# Patient Record
Sex: FEMALE | Race: WHITE | Employment: OTHER | URBAN - NONMETROPOLITAN AREA
[De-identification: names, ages, dates, MRNs, and addresses within clinical notes are randomized per-mention and may not be internally consistent; named-entity substitution may affect disease eponyms.]

---

## 2017-05-26 ENCOUNTER — HOSPITAL ENCOUNTER (OUTPATIENT)
Dept: LAB | Age: 72
Discharge: HOME | End: 2017-05-26
Payer: MEDICARE

## 2017-05-26 DIAGNOSIS — E78.2 MIXED HYPERLIPIDEMIA: ICD-10-CM

## 2017-05-26 DIAGNOSIS — I10 ESSENTIAL HYPERTENSION, BENIGN: ICD-10-CM

## 2017-05-26 DIAGNOSIS — E03.9 HYPOTHYROIDISM, UNSPECIFIED TYPE: ICD-10-CM

## 2017-05-26 LAB
ALT SERPL-CCNC: 15 U/L (ref 5–33)
ANION GAP SERPL CALCULATED.3IONS-SCNC: 10 MMOL/L (ref 9–17)
AST SERPL-CCNC: 24 U/L
BUN BLDV-MCNC: 19 MG/DL (ref 8–23)
BUN/CREAT BLD: 25 (ref 9–20)
CALCIUM SERPL-MCNC: 9.3 MG/DL (ref 8.6–10.4)
CHLORIDE BLD-SCNC: 101 MMOL/L (ref 98–107)
CHOLESTEROL/HDL RATIO: 2.8
CHOLESTEROL: 199 MG/DL
CO2: 26 MMOL/L (ref 20–31)
CREAT SERPL-MCNC: 0.77 MG/DL (ref 0.5–0.9)
GFR AFRICAN AMERICAN: >60 ML/MIN
GFR NON-AFRICAN AMERICAN: >60 ML/MIN
GFR SERPL CREATININE-BSD FRML MDRD: ABNORMAL ML/MIN/{1.73_M2}
GFR SERPL CREATININE-BSD FRML MDRD: ABNORMAL ML/MIN/{1.73_M2}
GLUCOSE BLD-MCNC: 83 MG/DL (ref 70–99)
HDLC SERPL-MCNC: 70 MG/DL
LDL CHOLESTEROL: 110 MG/DL (ref 0–130)
POTASSIUM SERPL-SCNC: 4.4 MMOL/L (ref 3.7–5.3)
SODIUM BLD-SCNC: 137 MMOL/L (ref 135–144)
THYROXINE, FREE: 0.91 NG/DL (ref 0.93–1.7)
TRIGL SERPL-MCNC: 95 MG/DL
TSH SERPL DL<=0.05 MIU/L-ACNC: 4.5 MIU/L (ref 0.3–5)
VLDLC SERPL CALC-MCNC: NORMAL MG/DL (ref 1–30)

## 2017-05-26 PROCEDURE — 80061 LIPID PANEL: CPT

## 2017-05-26 PROCEDURE — 36415 COLL VENOUS BLD VENIPUNCTURE: CPT

## 2017-05-26 PROCEDURE — 84460 ALANINE AMINO (ALT) (SGPT): CPT

## 2017-05-26 PROCEDURE — 84450 TRANSFERASE (AST) (SGOT): CPT

## 2017-05-26 PROCEDURE — 84443 ASSAY THYROID STIM HORMONE: CPT

## 2017-05-26 PROCEDURE — 80048 BASIC METABOLIC PNL TOTAL CA: CPT

## 2017-05-26 PROCEDURE — 84439 ASSAY OF FREE THYROXINE: CPT

## 2017-10-04 ENCOUNTER — HOSPITAL ENCOUNTER (OUTPATIENT)
Dept: GENERAL RADIOLOGY | Age: 72
Discharge: HOME | End: 2017-10-04
Payer: MEDICARE

## 2017-10-04 ENCOUNTER — HOSPITAL ENCOUNTER (OUTPATIENT)
Age: 72
Discharge: HOME | End: 2017-10-04
Payer: MEDICARE

## 2017-10-04 DIAGNOSIS — M76.31 ILIOTIBIAL BAND SYNDROME, RIGHT: ICD-10-CM

## 2017-10-04 PROCEDURE — 73502 X-RAY EXAM HIP UNI 2-3 VIEWS: CPT

## 2017-10-09 ENCOUNTER — HOSPITAL ENCOUNTER (OUTPATIENT)
Dept: PHYSICAL THERAPY | Age: 72
Setting detail: THERAPIES SERIES
Discharge: HOME | End: 2017-10-09
Payer: MEDICARE

## 2017-10-09 PROCEDURE — 97140 MANUAL THERAPY 1/> REGIONS: CPT

## 2017-10-09 PROCEDURE — G8978 MOBILITY CURRENT STATUS: HCPCS

## 2017-10-09 PROCEDURE — G8979 MOBILITY GOAL STATUS: HCPCS

## 2017-10-09 PROCEDURE — 97110 THERAPEUTIC EXERCISES: CPT

## 2017-10-09 PROCEDURE — G0283 ELEC STIM OTHER THAN WOUND: HCPCS

## 2017-10-09 PROCEDURE — 97162 PT EVAL MOD COMPLEX 30 MIN: CPT

## 2017-10-09 NOTE — PROGRESS NOTES
Phone: [address]          Fax: [phone]                      Outpatient Physical Therapy                                                                      Evaluation  Date: 10/9/2017  Patient: Michelle Bruner  : [id number]  CSN #: [id number]  Referring Practitioner: German Graham CNP    Referral Date : 10/04/17     Diagnosis: iliotibial band syndrome right M76.31    Treatment Diagnosis: low back pain with radicuklar symptoms/ITband syndrome   Onset Date: 17  PT Insurance Information: Medicare/ [employer]   Total # of Visits Approved: 12   Total # of Visits to Date: 1  No Show: 0  Canceled Appointment: 0     Subjective  Subjective: Pt reports approx 2 weeks ago she started with R lateral leg pain. Pt reports she could barely walk last week and saw her doctor who put her on predisone. Pt reports the medication has helped her pain significantly. Pt has a history of back surgeries including fusion in  and prior surgery in . Additional Pertinent Hx: anxiety   Pain Screening  Patient Currently in Pain: Denies          Objective     Observation/Palpation  Posture: Poor  Palpation: tenderness to R piriformis and R ITband      Strength RLE  Comment: hip grossly 4-/5 knee 5/5 ankle 5/5   Strength LLE  Comment: hip grossly 4/5, knee 5/5 ankle 5/5         Additional Measures  Flexibility: decreased hamstring flexibility by 50%, decreased piriformis flexibility by 50-75%     Assessment  Body structures, Functions, Activity limitations: Decreased functional mobility , Decreased ROM, Decreased strength  Assessment: Pt is a 71year old female that presents with radicular pain down R buttocks into lateral thigh. Pt states she has been on predisone which has reduced her pain significatly. Pt has a history of low back surgeries. Pt presents with pain with ambulation, tenderness to R piriformis and ITband, and decreased flexibility.  Pt will benefit from skilled PT in

## 2017-10-09 NOTE — PLAN OF CARE
will report 25% decrease in tenderness with palpation in piriformis and ITband  Long term goals  Time Frame for Long term goals : 6 weeks  Long term goal 1: Pt will be safe and independent with her HEP  Long term goal 2: Pt will tolerate ambulation of unlimited distances without radiating pain into her R leg  Long term goal 3: Pt will improve hamstring/piriformis flexibility by 25% in order to reduce tension in low back  Long term goal 4: Pt will deny any tenderness to R piriformis and ITband during palpation and STM    Prognosis  Prognosis: Good    Treatment Plan   Times per week: 2x/wk   Plan weeks: 6 weeks   [x]HP/CP      [x]Electrical Stim   [x]Therapeutic Exercise      []Gait Training  [x]Aquatics   [x]Ultrasound         [x]Patient Education/HEP   [x]Manual Therapy  []Traction    [x]Neuro-regi        []Soft Tissue Mobs            []Home TENS  []Iontophoresis    []Orthotic casting/fitting      []Dry Needling             Electronically signed by: Chu Deal, PT, DPT    Date: 10/9/2017      ______________________________________ Date: 10/9/2017   Physician Signature

## 2017-10-11 ENCOUNTER — HOSPITAL ENCOUNTER (OUTPATIENT)
Dept: PHYSICAL THERAPY | Age: 72
Setting detail: THERAPIES SERIES
Discharge: HOME | End: 2017-10-11
Payer: MEDICARE

## 2017-10-11 PROCEDURE — G0283 ELEC STIM OTHER THAN WOUND: HCPCS

## 2017-10-11 PROCEDURE — 97110 THERAPEUTIC EXERCISES: CPT

## 2017-10-11 PROCEDURE — 97140 MANUAL THERAPY 1/> REGIONS: CPT

## 2017-10-11 ASSESSMENT — PAIN DESCRIPTION - ORIENTATION: ORIENTATION: RIGHT

## 2017-10-11 ASSESSMENT — PAIN SCALES - GENERAL: PAINLEVEL_OUTOF10: 1

## 2017-10-11 ASSESSMENT — PAIN DESCRIPTION - LOCATION: LOCATION: LEG;HIP

## 2017-10-11 ASSESSMENT — PAIN DESCRIPTION - PAIN TYPE: TYPE: ACUTE PAIN

## 2017-10-11 NOTE — PROGRESS NOTES
Physical Therapy  Phone: [facility]           Fax: [phone]                           Outpatient Physical Therapy                                                                            Daily Note    Patient: Kenny Right : [birth year]109  CSN #: [id number]   Referring Practitioner:  Lupis Deutsch CNP    Referral Date : 10/04/17     Date: 10/11/2017    Diagnosis: iliotibial band syndrome right M76.31  Treatment Diagnosis: low back pain with radicular symptoms/ITband syndrome     Onset Date: 17  PT Insurance Information: Medicare/ [employer]   Total # of Visits Approved: 12 Per Physician Order  Total # of Visits to Date: 2  No Show: 0  Canceled Appointment: 0      Pre-Treatment Pain:  1/10  Subjective: Pt states she feels the weather has made her slightly sore, as well as having been working on the exercises. Exercises:  Exercise 1: SKTC 2x20 sec  Exercise 2: LTR x10  Exercise 3: 90/90 2x10  Exercise 4: Piriformis stretch 3x30 sec  Exercise 5: hip flexor stretch 2x30 sec  Exercise 6: Scifit x5 min  Exercise 7: Prone hip extension knee flexed x10  Exercise 8: SLR x10, glute sets x10            Manual:      Soft Tissue Mobalization: STM to R piriformis and ITband    Modalities:  Moist heat: 15 min with estim to R buttocks/Lateral thigh      E-stim (parameters): IFC 15 minutes to R piriformis/Itband to reduce pain        Assessment  Body structures, Functions, Activity limitations: Decreased functional mobility , Decreased ROM, Decreased strength  Assessment: Pt did well with light strengthening progression. Difficulty with not substituting with LB muscles to complete glute strengthening in prone. Patient Education  Gradual strengthening to avoid pain irritation. Pt verbalized/demonstrated good understanding:     [x] Yes         [] No, pt required further clarification. Post Treatment Pain:  0-1/10      Plan   Continue gradual progression to pt tolerance. Goals  (Total # of Visits to Date: 2)   Short Term Goals - Time Frame for Short term goals: 3 weeks     Short term goal 1: Pt will be educated on her POC and HEP-met                                        [x]Met   []Partially met  []Not met   Short term goal 2: Pt will report 25% decrease in radicular symptoms down her leg with ambulation  []Met   []Partially met  []Not met   Short term goal 3: Pt will report 25% decrease in tenderness with palpation in piriformis and ITband  []Met   []Partially met  []Not met      []Met   []Partially met  []Not met     Long Term Goals - Time Frame for Long term goals : 6 weeks  Long term goal 1: Pt will be safe and independent with her HEP []Met  []Partially met  []Not met   Long term goal 2: Pt will tolerate ambulation of unlimited distances without radiating pain into her R leg []Met  []Partially met  []Not met   Long term goal 3: Pt will improve hamstring/piriformis flexibility by 25% in order to reduce tension in low back []Met  []Partially met  []Not met   Long term goal 4: Pt will deny any tenderness to R piriformis and ITband during palpation and STM []Met  []Partially met  []Not met     []Met  []Partially met  []Not met       Minutes Tracking:  Time In: 0930  Time Out: [address]  Minutes: GÓMEZ T[address] PTA Date: 10/11/2017

## 2017-10-17 ENCOUNTER — HOSPITAL ENCOUNTER (OUTPATIENT)
Dept: PHYSICAL THERAPY | Age: 72
Setting detail: THERAPIES SERIES
Discharge: HOME | End: 2017-10-17
Payer: MEDICARE

## 2017-10-17 PROCEDURE — 97110 THERAPEUTIC EXERCISES: CPT

## 2017-10-17 PROCEDURE — 97140 MANUAL THERAPY 1/> REGIONS: CPT

## 2017-10-17 PROCEDURE — G0283 ELEC STIM OTHER THAN WOUND: HCPCS

## 2017-10-17 ASSESSMENT — PAIN SCALES - GENERAL: PAINLEVEL_OUTOF10: 4

## 2017-10-17 ASSESSMENT — PAIN DESCRIPTION - LOCATION: LOCATION: LEG;HIP

## 2017-10-17 ASSESSMENT — PAIN DESCRIPTION - ORIENTATION: ORIENTATION: RIGHT

## 2017-10-17 NOTE — PROGRESS NOTES
Physical Therapy  Phone: [facility]           Fax: [phone]                           Outpatient Physical Therapy                                                                            Daily Note    Patient: Omega Richardson : 17[birth year]  CSN #: [id number]   Referring Practitioner:  Abelino Leigh CNP    Referral Date : 10/04/17     Date: 10/17/2017    Diagnosis: iliotibial band syndrome right M76.31  Treatment Diagnosis: low back pain with radicular symptoms/ITband syndrome     Onset Date: 17  PT Insurance Information: Medicare/ [employer]   Total # of Visits Approved: 12 Per Physician Order  Total # of Visits to Date: 3  No Show: 0  Canceled Appointment: 0      Pre-Treatment Pain:  /10  Subjective: Pt states she has been feeling better, so did a lot of housework yesterday and is more sore today. No increased soreness or pain following previous PT session and was able to walk a couple miles on a dirt path over the weekend and felt really good. Exercises:  Exercise 1: SKTC 2x20 sec  Exercise 2: LTR x10  Exercise 3: 90/90 2x10  Exercise 4: Piriformis stretch 3x30 sec  Exercise 5: hip flexor stretch 2x30 sec  Exercise 6: Scifit x6 min  Exercise 7: Prone hip extension knee flexed x10  Exercise 8: SLR x10, glute sets x10  Exercise 9: R clamshell in side-lying x10                  Manual:   Soft Tissue Mobalization: STM to R piriformis and ITband with ThermoStim probe x10 min with heat/stim application to decrease tightness and pain    Modalities:  Cryotherapy (Minutes\Location): 15 min with IFC to decrease inflammation and pain   E-stim (parameters): IFC 15 minutes to R piriformis/Itband to reduce pain           Assessment  Body structures, Functions, Activity limitations: Decreased functional mobility , Decreased ROM, Decreased strength  Assessment: Pt continues to do well with stretching and exercise. No increased pain after exercise.  Ice applied with electrical

## 2017-10-19 ENCOUNTER — HOSPITAL ENCOUNTER (OUTPATIENT)
Dept: PHYSICAL THERAPY | Age: 72
Setting detail: THERAPIES SERIES
Discharge: HOME | End: 2017-10-19
Payer: MEDICARE

## 2017-10-19 PROCEDURE — 97110 THERAPEUTIC EXERCISES: CPT

## 2017-10-19 PROCEDURE — 97140 MANUAL THERAPY 1/> REGIONS: CPT

## 2017-10-19 NOTE — PROGRESS NOTES
Physical Therapy  Phone: [facility]           Fax: [phone]                           Outpatient Physical Therapy                                                                            Daily Note    Patient: Steffany Brambila : [birth year]  CSN #: [id number]   Referring Practitioner:  Bc Bernal CNP    Referral Date : 10/04/17     Date: 10/19/2017    Diagnosis: iliotibial band syndrome right M76.31       Onset Date: 17  PT Insurance Information: Medicare/ [employer]   Total # of Visits Approved: 12 Per Physician Order  Total # of Visits to Date: 4  No Show: 0  Canceled Appointment: 0      Pre-Treatment Pain:  0/10  Subjective: Pt states she has been done with the steriod for several days now. She is feeling 'really good' and had no pain the rest of the day after last tx and no pain yesterday. Comes to therapy denying pain and feels the Thermoprobe application was very beneficial. She was able to do some yard work yesterday without increased pain.     Exercises:  Exercise 1: SKTC 2x20 sec  Exercise 3: 90/90 2x10  Exercise 4: Piriformis stretch 3x30 sec  Exercise 5: hip flexor stretch 2x30 sec  Exercise 6: Scifit x6 min  Exercise 7: Prone hip extension knee flexed [id number] due to LBP  Exercise 8: SLR 2x10, glute sets 2x10  Exercise 9: R clamshell in side-lying 2x10  Exercise 10: Hkly abduction RTB x15, adduction purple ball x15  Exercise 11: Step up FWD/LAT 6\" x10 RLE                Manual:   Soft Tissue Mobalization: STM to R piriformis and ITband with ThermoStim probe x10 min with heat/stim application to decrease tightness and pain    Modalities:      Cryotherapy (Minutes\Location): 15 min to decrease inflammation prevent soreness           Assessment  Body structures, Functions, Activity limitations: Decreased functional mobility , Decreased ROM, Decreased strength  Assessment: Pt progressing with light strengthening without issue, aside from attempted prone glute strengthening which is uncomfortable for her LB. Deferred uncomfortable exercise due to previous history of LBP. Patient Education  Strengthening progression in pain free ranges. Pt verbalized/demonstrated good understanding:     [x] Yes         [] No, pt required further clarification. Post Treatment Pain:  0/10      Plan            Goals  (Total # of Visits to Date: 4)   Short Term Goals - Time Frame for Short term goals: 3 weeks     Short term goal 1: Pt will be educated on her POC and HEP-met                                        [x]Met   []Partially met  []Not met   Short term goal 2: Pt will report 25% decrease in radicular symptoms down her leg with ambulation-Met, ongoing.   [x]Met   []Partially met  []Not met   Short term goal 3: Pt will report 25% decrease in tenderness with palpation in piriformis and ITband  []Met   []Partially met  []Not met      []Met   []Partially met  []Not met     Long Term Goals - Time Frame for Long term goals : 6 weeks  Long term goal 1: Pt will be safe and independent with her HEP []Met  []Partially met  []Not met   Long term goal 2: Pt will tolerate ambulation of unlimited distances without radiating pain into her R leg []Met  []Partially met  []Not met   Long term goal 3: Pt will improve hamstring/piriformis flexibility by 25% in order to reduce tension in low back []Met  []Partially met  []Not met   Long term goal 4: Pt will deny any tenderness to R piriformis and ITband during palpation and STM []Met  []Partially met  []Not met     []Met  []Partially met  []Not met       Minutes Tracking:  Time In: 0930  Time Out: 1030  Minutes: [address] PTA Date: 10/19/2017

## 2017-10-24 ENCOUNTER — HOSPITAL ENCOUNTER (OUTPATIENT)
Dept: PHYSICAL THERAPY | Age: 72
Setting detail: THERAPIES SERIES
Discharge: HOME | End: 2017-10-24
Payer: MEDICARE

## 2017-10-24 PROCEDURE — 97140 MANUAL THERAPY 1/> REGIONS: CPT

## 2017-10-24 PROCEDURE — 97110 THERAPEUTIC EXERCISES: CPT

## 2017-10-24 PROCEDURE — G0283 ELEC STIM OTHER THAN WOUND: HCPCS

## 2017-10-24 NOTE — PROGRESS NOTES
Phone: [facility]           Fax: [phone]                           Outpatient Physical Therapy                                                                            Daily Note    Patient: Homa Mott : [age]  CSN #: [id number]   Referring Practitioner:  Marguerite Garza CNP    Referral Date : 10/04/17     Date: 10/24/2017    Diagnosis: iliotibial band syndrome right M76.31  Treatment Diagnosis: low back pain with radicular symptoms/ITband syndrome     Onset Date: 17  PT Insurance Information: Medicare/ [employer]   Total # of Visits Approved: 12 Per Physician Order  Total # of Visits to Date: 5  No Show: 0  Canceled Appointment: 0      Pre-Treatment Pain:  0/10  Subjective: Pt reports she feels great and isn't having any pain. Pt states she would like to be done with PT in the next week. Exercises:  Exercise 1: SKTC 2x20 sec  Exercise 2: LTR x10  Exercise 3: 90/90 2x10  Exercise 4: Piriformis stretch 3x30 sec  Exercise 5: hip flexor stretch 2x30 sec  Exercise 6: Scifit x6 min     Exercise 8: SLR 2x10, glute sets 2x10  Exercise 9: R clamshell in side-lying 2x10  Exercise 10: Hkly abduction RTB x15, adduction purple ball x15  Exercise 11: Step up FWD/LAT 6\" x10 RLE          Manual:        Soft Tissue Mobalization: STM to R piriformis and ITband with ThermoStim probe x10 min with heat/stim application to decrease tightness and pain    Modalities:      Cryotherapy (Minutes\Location): 15 min to decrease inflammation prevent soreness       E-stim (parameters): IFC 15 minutes to R piriformis/Itband to reduce pain          Assessment  Assessment: Pt has met all her short term goals and is progressing with her tanner gterm ones. Pt is pushing to be done with therapy soon since she isn't having any pain. PT talked her into at least 2 more visits.      Patient Education  Pt educated on continuing for 2 more visit   Pt verbalized/demonstrated good understanding: [x] Yes         [] No, pt required further clarification. Post Treatment Pain:  0/10      Plan  Times per week: 2x/wk   Plan weeks: 6 weeks       Goals  (Total # of Visits to Date: 5)   Short Term Goals - Time Frame for Short term goals: 3 weeks   Short term goal 1: Pt will be educated on her POC and HEP-met                                        []Met   []Partially met  []Not met   Short term goal 2: Pt will report 25% decrease in radicular symptoms down her leg with ambulation-Met, ongoing.   []Met   []Partially met  []Not met   Short term goal 3: Pt will report 25% decrease in tenderness with palpation in piriformis and ITband-met   []Met   []Partially met  []Not met      []Met   []Partially met  []Not met     Long Term Goals - Time Frame for Long term goals : 6 weeks  Long term goal 1: Pt will be safe and independent with her HEP []Met  []Partially met  []Not met   Long term goal 2: Pt will tolerate ambulation of unlimited distances without radiating pain into her R leg []Met  []Partially met  []Not met   Long term goal 3: Pt will improve hamstring/piriformis flexibility by 25% in order to reduce tension in low back []Met  []Partially met  []Not met   Long term goal 4: Pt will deny any tenderness to R piriformis and ITband during palpation and STM []Met  []Partially met  []Not met     []Met  []Partially met  []Not met       Minutes Tracking:  Time In: 1110  Time Out: 1210  Minutes: [address] [place] [address] DPT, PT Date: 10/24/2017

## 2017-10-26 ENCOUNTER — HOSPITAL ENCOUNTER (OUTPATIENT)
Dept: PHYSICAL THERAPY | Age: 72
Setting detail: THERAPIES SERIES
Discharge: HOME | End: 2017-10-26
Payer: MEDICARE

## 2017-10-26 PROCEDURE — 97110 THERAPEUTIC EXERCISES: CPT

## 2017-10-26 PROCEDURE — G0283 ELEC STIM OTHER THAN WOUND: HCPCS

## 2017-10-26 NOTE — PROGRESS NOTES
Phone: [facility]           Fax: [phone]                           Outpatient Physical Therapy                                                                            Daily Note    Patient: Melinda Prakash :   CSN #: [id number]   Referring Practitioner:  Carson Stack CNP    Referral Date : 10/04/17     Date: 10/26/2017    Diagnosis: iliotibial band syndrome right M76.31  Treatment Diagnosis: low back pain with radicular symptoms/ITband syndrome     Onset Date: 17  PT Insurance Information: Medicare/ [employer]   Total # of Visits Approved: 12 Per Physician Order  Total # of Visits to Date: 6  No Show: 0  Canceled Appointment: 0      Pre-Treatment Pain:  0/10  Subjective: Pt reports she feels good. Pt is doing well on her home exercise program and with her house work she is able to control the pain. Pain is 0/10 today other than a little post ex soreness. Exercises:  Exercise 1: SKTC 2x20 sec  Exercise 2: LTR x10  Exercise 3: 90/90 HS stretch 2x30  Exercise 4: Piriformis stretch 3x30 sec     Exercise 6: Scifit x8 min  Exercise 7: Star Trac 2 x 10, 4 plates  Exercise 8: SLR 2x10, 2#/ glute sets 2x10  Exercise 9: R clamshell in side-lying 2x10  Exercise 10: Hkly abduction RTB x15, adduction purple ball x15  Exercise 11: Step up FWD/LAT 6\" x15 RLE   Exercise 12: Dillon T-band Hip abd/ext red, x10       Manual:   Soft Tissue Mobalization: not today    Modalities:  Moist heat: 15 min with estim to R buttocks/Lateral thigh            E-stim (parameters): IFC 15 minutes to R piriformis to reduce pain      Assessment  Assessment: Progressed to further strengthening and instructed patient in proper body mechanics.  Pt required mod verbal cues for slowing pace with ex    Patient Education  Discussed decreasing modality to just IFC for post ex soreness then anticipate progression and discharge next visit  Pt verbalized/demonstrated good understanding:     [x] Yes

## 2017-10-31 ENCOUNTER — HOSPITAL ENCOUNTER (OUTPATIENT)
Dept: PHYSICAL THERAPY | Age: 72
Setting detail: THERAPIES SERIES
Discharge: HOME | End: 2017-10-31
Payer: MEDICARE

## 2017-10-31 PROCEDURE — G8979 MOBILITY GOAL STATUS: HCPCS

## 2017-10-31 PROCEDURE — 97110 THERAPEUTIC EXERCISES: CPT

## 2017-10-31 PROCEDURE — G8978 MOBILITY CURRENT STATUS: HCPCS

## 2017-10-31 NOTE — PROGRESS NOTES
Phone: [facility]           Fax: [phone]                           Outpatient Physical Therapy                                                                            Daily Note    Patient: Tammy Puentes : 589  CSN #: [id number]   Referring Practitioner:  Chris Ordaz CNP    Referral Date : 10/04/17     Date: 10/31/2017    Diagnosis: iliotibial band syndrome right M76.31  Treatment Diagnosis: low back pain with radicular symptoms/ITband syndrome     Onset Date: 17  PT Insurance Information: Medicare/ [employer]   Total # of Visits Approved: 12 Per Physician Order  Total # of Visits to Date: 7  No Show: 0  Canceled Appointment: 0      Pre-Treatment Pain:  0/10  Subjective: Pt states she isn't having any pain and she would like to be D/C today     Exercises:  Exercise 1: SKTC 2x20 sec  Exercise 2: LTR x10  Exercise 3: 90/90 HS stretch 2x30  Exercise 4: Piriformis stretch 3x30 sec     Exercise 6: Scifit x10 min  Exercise 7: Star Trac 2 x 10, 4 plates  Exercise 8: SLR 2x10, 2#/ glute sets 2x10  Exercise 9: R clamshell in side-lying 2x10  Exercise 10: Hkly abduction RTB x15, adduction purple ball x15  Exercise 11: Step up FWD/LAT 6\" x15 RLE   Exercise 12: Dillon T-band Hip abd/ext red, x10       Assessment  Assessment: Pt has been to 7 therapy visits and denies any pain. Pt reports feeling 100% better and has met both her short term and long term goals. Pt has increase flexibility by 50% and denies any tenderness to ITband. Pt will be placed on a 2 week hold and was given and HEP     Patient Education  Pt educated on 2 week hold and reviewed her HEP  Pt verbalized/demonstrated good understanding:     [x] Yes         [] No, pt required further clarification.     Post Treatment Pain:  0/10      Plan  Times per week: 2x/wk   Plan weeks: 6 weeks       Goals  (Total # of Visits to Date: 7)   Short Term Goals - Time Frame for Short term goals: 3 weeks    Short term goal

## 2017-11-02 ENCOUNTER — HOSPITAL ENCOUNTER (OUTPATIENT)
Dept: PHYSICAL THERAPY | Age: 72
Setting detail: THERAPIES SERIES
Discharge: HOME | End: 2017-11-02
Payer: MEDICARE

## 2017-11-20 ENCOUNTER — HOSPITAL ENCOUNTER (OUTPATIENT)
Dept: WOMENS IMAGING | Age: 72
Discharge: HOME | End: 2017-11-20
Payer: MEDICARE

## 2017-11-20 DIAGNOSIS — Z12.39 BREAST CANCER SCREENING: ICD-10-CM

## 2017-11-20 PROCEDURE — G0202 SCR MAMMO BI INCL CAD: HCPCS

## 2017-12-07 ENCOUNTER — HOSPITAL ENCOUNTER (OUTPATIENT)
Dept: LAB | Age: 72
Discharge: HOME | End: 2017-12-07
Payer: MEDICARE

## 2017-12-07 DIAGNOSIS — I10 ESSENTIAL HYPERTENSION, BENIGN: ICD-10-CM

## 2017-12-07 DIAGNOSIS — E03.9 HYPOTHYROIDISM, UNSPECIFIED TYPE: ICD-10-CM

## 2017-12-07 DIAGNOSIS — Z11.59 NEED FOR HEPATITIS C SCREENING TEST: ICD-10-CM

## 2017-12-07 DIAGNOSIS — E78.2 MIXED HYPERLIPIDEMIA: ICD-10-CM

## 2017-12-07 LAB
ALT SERPL-CCNC: 15 U/L (ref 5–33)
ANION GAP SERPL CALCULATED.3IONS-SCNC: 13 MMOL/L (ref 9–17)
AST SERPL-CCNC: 23 U/L
BUN BLDV-MCNC: 22 MG/DL (ref 8–23)
BUN/CREAT BLD: 23 (ref 9–20)
CALCIUM SERPL-MCNC: 9.3 MG/DL (ref 8.6–10.4)
CHLORIDE BLD-SCNC: 100 MMOL/L (ref 98–107)
CHOLESTEROL/HDL RATIO: 2.9
CHOLESTEROL: 196 MG/DL
CO2: 26 MMOL/L (ref 20–31)
CREAT SERPL-MCNC: 0.94 MG/DL (ref 0.5–0.9)
GFR AFRICAN AMERICAN: >60 ML/MIN
GFR NON-AFRICAN AMERICAN: 59 ML/MIN
GFR SERPL CREATININE-BSD FRML MDRD: ABNORMAL ML/MIN/{1.73_M2}
GFR SERPL CREATININE-BSD FRML MDRD: ABNORMAL ML/MIN/{1.73_M2}
GLUCOSE BLD-MCNC: 86 MG/DL (ref 70–99)
HDLC SERPL-MCNC: 68 MG/DL
HEPATITIS C ANTIBODY: NONREACTIVE
LDL CHOLESTEROL: 111 MG/DL (ref 0–130)
POTASSIUM SERPL-SCNC: 5.1 MMOL/L (ref 3.7–5.3)
SODIUM BLD-SCNC: 139 MMOL/L (ref 135–144)
T3 FREE: 2.46 PG/ML (ref 2.02–4.43)
THYROXINE, FREE: 0.96 NG/DL (ref 0.93–1.7)
TRIGL SERPL-MCNC: 85 MG/DL
TSH SERPL DL<=0.05 MIU/L-ACNC: 4.56 MIU/L (ref 0.3–5)
VLDLC SERPL CALC-MCNC: NORMAL MG/DL (ref 1–30)

## 2017-12-07 PROCEDURE — 80061 LIPID PANEL: CPT

## 2017-12-07 PROCEDURE — 36415 COLL VENOUS BLD VENIPUNCTURE: CPT

## 2017-12-07 PROCEDURE — 84460 ALANINE AMINO (ALT) (SGPT): CPT

## 2017-12-07 PROCEDURE — 84450 TRANSFERASE (AST) (SGOT): CPT

## 2017-12-07 PROCEDURE — 84481 FREE ASSAY (FT-3): CPT

## 2017-12-07 PROCEDURE — 84439 ASSAY OF FREE THYROXINE: CPT

## 2017-12-07 PROCEDURE — 84443 ASSAY THYROID STIM HORMONE: CPT

## 2017-12-07 PROCEDURE — 80048 BASIC METABOLIC PNL TOTAL CA: CPT

## 2017-12-07 PROCEDURE — 86803 HEPATITIS C AB TEST: CPT

## 2018-01-05 NOTE — DISCHARGE SUMMARY
Phone: [facility]          Fax: [phone]                            Outpatient Physical Therapy                                                                    Discharge Summary    Patient: Thomas Talavera  :   CSN #: [id number]   Referring physician: No admitting provider for patient encounter. Referring Practitioner: Tuyet Lim CNP      Diagnosis: iliotibial band syndrome right M76.31      Date Treatment Initiated: 10/09/17  Date of Last Treatment: 10/31/17      PT Visit Information  Onset Date: 17  PT Insurance Information: Medicare/ [employer]   Total # of Visits Approved: 12  Total # of Visits to Date: 7  Plan of Care/Certification Expiration Date: 17  No Show: 0  Canceled Appointment: 0  Progress Note Counter: Gcodes 7th visit       Frequency/Duration   2x  times per week    6 weeks      Treatment Received  [x]HP/CP      [x]Electrical Stim   [x]Therapeutic Exercise      []Gait Training  [x]Aquatics   [x]Ultrasound         [x]Patient Education/HEP   [x]Manual Therapy  []Traction    [x]Neuro-regi        []Soft Tissue Mobs            []Home TENS  []Iontophoresis    []Orthotic casting/fitting      []Dry Needling    Assessment  Assessment: Pt met all her goals and was placed on 2 week hold, no issues during 2 week hold. Pt D/C as of 11/15/17           Goals  Short term goals  Time Frame for Short term goals: 3 weeks  Short term goal 1: Pt will be educated on her POC and HEP-met  Short term goal 2: Pt will report 25% decrease in radicular symptoms down her leg with ambulation-Met, ongoing.   Short term goal 3: Pt will report 25% decrease in tenderness with palpation in piriformis and ITband-met     Long term goals  Time Frame for Long term goals : 6 weeks  Long term goal 1: Pt will be safe and independent with her HEP-met  Long term goal 2: Pt will tolerate ambulation of unlimited distances without radiating pain into her R leg-met  Long term goal

## 2018-05-17 ENCOUNTER — OFFICE VISIT (OUTPATIENT)
Dept: PRIMARY CARE CLINIC | Age: 73
End: 2018-05-17
Payer: MEDICARE

## 2018-05-17 ENCOUNTER — HOSPITAL ENCOUNTER (OUTPATIENT)
Age: 73
Discharge: HOME | End: 2018-05-19
Payer: MEDICARE

## 2018-05-17 ENCOUNTER — HOSPITAL ENCOUNTER (OUTPATIENT)
Dept: GENERAL RADIOLOGY | Age: 73
Discharge: HOME | End: 2018-05-19
Payer: MEDICARE

## 2018-05-17 ENCOUNTER — TELEPHONE (OUTPATIENT)
Dept: PRIMARY CARE CLINIC | Age: 73
End: 2018-05-17

## 2018-05-17 VITALS
BODY MASS INDEX: 29.37 KG/M2 | RESPIRATION RATE: 18 BRPM | HEART RATE: 68 BPM | DIASTOLIC BLOOD PRESSURE: 92 MMHG | TEMPERATURE: 97.9 F | SYSTOLIC BLOOD PRESSURE: 142 MMHG | WEIGHT: 158 LBS

## 2018-05-17 DIAGNOSIS — M79.642 LEFT HAND PAIN: ICD-10-CM

## 2018-05-17 DIAGNOSIS — T14.8XXA CRUSHING INJURY: Primary | ICD-10-CM

## 2018-05-17 DIAGNOSIS — T14.8XXA CRUSHING INJURY: ICD-10-CM

## 2018-05-17 PROCEDURE — G8400 PT W/DXA NO RESULTS DOC: HCPCS | Performed by: NURSE PRACTITIONER

## 2018-05-17 PROCEDURE — G8427 DOCREV CUR MEDS BY ELIG CLIN: HCPCS | Performed by: NURSE PRACTITIONER

## 2018-05-17 PROCEDURE — G8417 CALC BMI ABV UP PARAM F/U: HCPCS | Performed by: NURSE PRACTITIONER

## 2018-05-17 PROCEDURE — 99213 OFFICE O/P EST LOW 20 MIN: CPT | Performed by: NURSE PRACTITIONER

## 2018-05-17 PROCEDURE — 1123F ACP DISCUSS/DSCN MKR DOCD: CPT | Performed by: NURSE PRACTITIONER

## 2018-05-17 PROCEDURE — 1036F TOBACCO NON-USER: CPT | Performed by: NURSE PRACTITIONER

## 2018-05-17 PROCEDURE — 73130 X-RAY EXAM OF HAND: CPT

## 2018-05-17 PROCEDURE — 4040F PNEUMOC VAC/ADMIN/RCVD: CPT | Performed by: NURSE PRACTITIONER

## 2018-05-17 PROCEDURE — 3017F COLORECTAL CA SCREEN DOC REV: CPT | Performed by: NURSE PRACTITIONER

## 2018-05-17 PROCEDURE — 1090F PRES/ABSN URINE INCON ASSESS: CPT | Performed by: NURSE PRACTITIONER

## 2018-05-17 ASSESSMENT — ENCOUNTER SYMPTOMS
COLOR CHANGE: 1
RESPIRATORY NEGATIVE: 1

## 2018-06-12 ENCOUNTER — HOSPITAL ENCOUNTER (OUTPATIENT)
Dept: LAB | Age: 73
Discharge: HOME | End: 2018-06-12
Payer: MEDICARE

## 2018-06-12 DIAGNOSIS — I10 ESSENTIAL HYPERTENSION: ICD-10-CM

## 2018-06-12 DIAGNOSIS — E03.9 HYPOTHYROIDISM, UNSPECIFIED TYPE: ICD-10-CM

## 2018-06-12 DIAGNOSIS — E78.2 MIXED HYPERLIPIDEMIA: ICD-10-CM

## 2018-06-12 LAB
ALT SERPL-CCNC: 16 U/L (ref 5–33)
ANION GAP SERPL CALCULATED.3IONS-SCNC: 11 MMOL/L (ref 9–17)
AST SERPL-CCNC: 24 U/L
BUN BLDV-MCNC: 16 MG/DL (ref 8–23)
BUN/CREAT BLD: 21 (ref 9–20)
CALCIUM SERPL-MCNC: 9.3 MG/DL (ref 8.6–10.4)
CHLORIDE BLD-SCNC: 103 MMOL/L (ref 98–107)
CHOLESTEROL/HDL RATIO: 2.7
CHOLESTEROL: 200 MG/DL
CO2: 28 MMOL/L (ref 20–31)
CREAT SERPL-MCNC: 0.77 MG/DL (ref 0.5–0.9)
GFR AFRICAN AMERICAN: >60 ML/MIN
GFR NON-AFRICAN AMERICAN: >60 ML/MIN
GFR SERPL CREATININE-BSD FRML MDRD: ABNORMAL ML/MIN/{1.73_M2}
GFR SERPL CREATININE-BSD FRML MDRD: ABNORMAL ML/MIN/{1.73_M2}
GLUCOSE BLD-MCNC: 87 MG/DL (ref 70–99)
HCT VFR BLD CALC: 41.2 % (ref 36.3–47.1)
HDLC SERPL-MCNC: 73 MG/DL
HEMOGLOBIN: 13.3 G/DL (ref 11.9–15.1)
LDL CHOLESTEROL: 110 MG/DL (ref 0–130)
MCH RBC QN AUTO: 30.9 PG (ref 25.2–33.5)
MCHC RBC AUTO-ENTMCNC: 32.3 G/DL (ref 28.4–34.8)
MCV RBC AUTO: 95.6 FL (ref 82.6–102.9)
NRBC AUTOMATED: 0 PER 100 WBC
PDW BLD-RTO: 12 % (ref 11.8–14.4)
PLATELET # BLD: 293 K/UL (ref 138–453)
PMV BLD AUTO: 8.6 FL (ref 8.1–13.5)
POTASSIUM SERPL-SCNC: 4.6 MMOL/L (ref 3.7–5.3)
RBC # BLD: 4.31 M/UL (ref 3.95–5.11)
SODIUM BLD-SCNC: 142 MMOL/L (ref 135–144)
T3 FREE: 2.5 PG/ML (ref 2.02–4.43)
THYROXINE, FREE: 0.95 NG/DL (ref 0.93–1.7)
TRIGL SERPL-MCNC: 87 MG/DL
TSH SERPL DL<=0.05 MIU/L-ACNC: 9.76 MIU/L (ref 0.3–5)
VLDLC SERPL CALC-MCNC: ABNORMAL MG/DL (ref 1–30)
WBC # BLD: 3.7 K/UL (ref 3.5–11.3)

## 2018-06-12 PROCEDURE — 85027 COMPLETE CBC AUTOMATED: CPT

## 2018-06-12 PROCEDURE — 80048 BASIC METABOLIC PNL TOTAL CA: CPT

## 2018-06-12 PROCEDURE — 84439 ASSAY OF FREE THYROXINE: CPT

## 2018-06-12 PROCEDURE — 84450 TRANSFERASE (AST) (SGOT): CPT

## 2018-06-12 PROCEDURE — 84460 ALANINE AMINO (ALT) (SGPT): CPT

## 2018-06-12 PROCEDURE — 84443 ASSAY THYROID STIM HORMONE: CPT

## 2018-06-12 PROCEDURE — 36415 COLL VENOUS BLD VENIPUNCTURE: CPT

## 2018-06-12 PROCEDURE — 84481 FREE ASSAY (FT-3): CPT

## 2018-06-12 PROCEDURE — 80061 LIPID PANEL: CPT

## 2018-08-22 ENCOUNTER — HOSPITAL ENCOUNTER (OUTPATIENT)
Dept: BONE DENSITY | Age: 73
Discharge: HOME | End: 2018-08-24
Payer: MEDICARE

## 2018-08-22 DIAGNOSIS — Z78.0 POST-MENOPAUSAL: ICD-10-CM

## 2018-08-22 PROCEDURE — 77080 DXA BONE DENSITY AXIAL: CPT

## 2018-11-21 ENCOUNTER — HOSPITAL ENCOUNTER (OUTPATIENT)
Dept: WOMENS IMAGING | Age: 73
Discharge: HOME | End: 2018-11-23
Payer: MEDICARE

## 2018-11-21 DIAGNOSIS — Z12.39 BREAST CANCER SCREENING: ICD-10-CM

## 2018-11-21 PROCEDURE — 77067 SCR MAMMO BI INCL CAD: CPT

## 2018-12-03 ENCOUNTER — HOSPITAL ENCOUNTER (OUTPATIENT)
Dept: LAB | Age: 73
Discharge: HOME | End: 2018-12-03
Payer: MEDICARE

## 2018-12-03 DIAGNOSIS — I10 ESSENTIAL HYPERTENSION: ICD-10-CM

## 2018-12-03 DIAGNOSIS — E78.2 MIXED HYPERLIPIDEMIA: ICD-10-CM

## 2018-12-03 DIAGNOSIS — E03.9 HYPOTHYROIDISM, UNSPECIFIED TYPE: Chronic | ICD-10-CM

## 2018-12-03 LAB
ALT SERPL-CCNC: 15 U/L (ref 5–33)
ANION GAP SERPL CALCULATED.3IONS-SCNC: 11 MMOL/L (ref 9–17)
AST SERPL-CCNC: 24 U/L
BUN BLDV-MCNC: 18 MG/DL (ref 8–23)
BUN/CREAT BLD: 23 (ref 9–20)
CALCIUM SERPL-MCNC: 9.2 MG/DL (ref 8.6–10.4)
CHLORIDE BLD-SCNC: 104 MMOL/L (ref 98–107)
CHOLESTEROL/HDL RATIO: 3.2
CHOLESTEROL: 202 MG/DL
CO2: 26 MMOL/L (ref 20–31)
CREAT SERPL-MCNC: 0.77 MG/DL (ref 0.5–0.9)
GFR AFRICAN AMERICAN: >60 ML/MIN
GFR NON-AFRICAN AMERICAN: >60 ML/MIN
GFR SERPL CREATININE-BSD FRML MDRD: ABNORMAL ML/MIN/{1.73_M2}
GFR SERPL CREATININE-BSD FRML MDRD: ABNORMAL ML/MIN/{1.73_M2}
GLUCOSE BLD-MCNC: 87 MG/DL (ref 70–99)
HCT VFR BLD CALC: 42.6 % (ref 36.3–47.1)
HDLC SERPL-MCNC: 64 MG/DL
HEMOGLOBIN: 13.5 G/DL (ref 11.9–15.1)
LDL CHOLESTEROL: 126 MG/DL (ref 0–130)
MCH RBC QN AUTO: 30.5 PG (ref 25.2–33.5)
MCHC RBC AUTO-ENTMCNC: 31.7 G/DL (ref 28.4–34.8)
MCV RBC AUTO: 96.2 FL (ref 82.6–102.9)
NRBC AUTOMATED: 0 PER 100 WBC
PDW BLD-RTO: 12.1 % (ref 11.8–14.4)
PLATELET # BLD: 287 K/UL (ref 138–453)
PMV BLD AUTO: 8.5 FL (ref 8.1–13.5)
POTASSIUM SERPL-SCNC: 4.5 MMOL/L (ref 3.7–5.3)
RBC # BLD: 4.43 M/UL (ref 3.95–5.11)
SODIUM BLD-SCNC: 141 MMOL/L (ref 135–144)
T3 FREE: 2.63 PG/ML (ref 2.02–4.43)
THYROXINE, FREE: 1.01 NG/DL (ref 0.93–1.7)
TRIGL SERPL-MCNC: 62 MG/DL
TSH SERPL DL<=0.05 MIU/L-ACNC: 3.39 MIU/L (ref 0.3–5)
VLDLC SERPL CALC-MCNC: ABNORMAL MG/DL (ref 1–30)
WBC # BLD: 4.2 K/UL (ref 3.5–11.3)

## 2018-12-03 PROCEDURE — 84443 ASSAY THYROID STIM HORMONE: CPT

## 2018-12-03 PROCEDURE — 85027 COMPLETE CBC AUTOMATED: CPT

## 2018-12-03 PROCEDURE — 80048 BASIC METABOLIC PNL TOTAL CA: CPT

## 2018-12-03 PROCEDURE — 84460 ALANINE AMINO (ALT) (SGPT): CPT

## 2018-12-03 PROCEDURE — 84450 TRANSFERASE (AST) (SGOT): CPT

## 2018-12-03 PROCEDURE — 84439 ASSAY OF FREE THYROXINE: CPT

## 2018-12-03 PROCEDURE — 80061 LIPID PANEL: CPT

## 2018-12-03 PROCEDURE — 36415 COLL VENOUS BLD VENIPUNCTURE: CPT

## 2018-12-03 PROCEDURE — 84481 FREE ASSAY (FT-3): CPT

## 2019-06-11 ENCOUNTER — HOSPITAL ENCOUNTER (OUTPATIENT)
Dept: LAB | Age: 74
Discharge: HOME | End: 2019-06-11
Payer: MEDICARE

## 2019-06-11 DIAGNOSIS — E78.2 MIXED HYPERLIPIDEMIA: ICD-10-CM

## 2019-06-11 DIAGNOSIS — I10 ESSENTIAL HYPERTENSION: ICD-10-CM

## 2019-06-11 DIAGNOSIS — E03.9 HYPOTHYROIDISM, UNSPECIFIED TYPE: ICD-10-CM

## 2019-06-11 LAB
ALT SERPL-CCNC: 17 U/L (ref 5–33)
ANION GAP SERPL CALCULATED.3IONS-SCNC: 9 MMOL/L (ref 9–17)
AST SERPL-CCNC: 28 U/L
BUN BLDV-MCNC: 18 MG/DL (ref 8–23)
BUN/CREAT BLD: 25 (ref 9–20)
CALCIUM SERPL-MCNC: 9.1 MG/DL (ref 8.6–10.4)
CHLORIDE BLD-SCNC: 95 MMOL/L (ref 98–107)
CHOLESTEROL/HDL RATIO: 2.6
CHOLESTEROL: 190 MG/DL
CO2: 29 MMOL/L (ref 20–31)
CREAT SERPL-MCNC: 0.72 MG/DL (ref 0.5–0.9)
GFR AFRICAN AMERICAN: >60 ML/MIN
GFR NON-AFRICAN AMERICAN: >60 ML/MIN
GFR SERPL CREATININE-BSD FRML MDRD: ABNORMAL ML/MIN/{1.73_M2}
GFR SERPL CREATININE-BSD FRML MDRD: ABNORMAL ML/MIN/{1.73_M2}
GLUCOSE BLD-MCNC: 100 MG/DL (ref 70–99)
HCT VFR BLD CALC: 38.6 % (ref 36.3–47.1)
HDLC SERPL-MCNC: 74 MG/DL
HEMOGLOBIN: 12.6 G/DL (ref 11.9–15.1)
LDL CHOLESTEROL: 100 MG/DL (ref 0–130)
MCH RBC QN AUTO: 31.3 PG (ref 25.2–33.5)
MCHC RBC AUTO-ENTMCNC: 32.6 G/DL (ref 28.4–34.8)
MCV RBC AUTO: 96 FL (ref 82.6–102.9)
NRBC AUTOMATED: 0 PER 100 WBC
PDW BLD-RTO: 12.1 % (ref 11.8–14.4)
PLATELET # BLD: 312 K/UL (ref 138–453)
PMV BLD AUTO: 8.3 FL (ref 8.1–13.5)
POTASSIUM SERPL-SCNC: 4.2 MMOL/L (ref 3.7–5.3)
RBC # BLD: 4.02 M/UL (ref 3.95–5.11)
SODIUM BLD-SCNC: 133 MMOL/L (ref 135–144)
T3 FREE: 2.44 PG/ML (ref 2.02–4.43)
THYROXINE, FREE: 0.91 NG/DL (ref 0.93–1.7)
TRIGL SERPL-MCNC: 79 MG/DL
TSH SERPL DL<=0.05 MIU/L-ACNC: 3.65 MIU/L (ref 0.3–5)
VLDLC SERPL CALC-MCNC: NORMAL MG/DL (ref 1–30)
WBC # BLD: 4 K/UL (ref 3.5–11.3)

## 2019-06-11 PROCEDURE — 85027 COMPLETE CBC AUTOMATED: CPT

## 2019-06-11 PROCEDURE — 84450 TRANSFERASE (AST) (SGOT): CPT

## 2019-06-11 PROCEDURE — 36415 COLL VENOUS BLD VENIPUNCTURE: CPT

## 2019-06-11 PROCEDURE — 84443 ASSAY THYROID STIM HORMONE: CPT

## 2019-06-11 PROCEDURE — 84460 ALANINE AMINO (ALT) (SGPT): CPT

## 2019-06-11 PROCEDURE — 80048 BASIC METABOLIC PNL TOTAL CA: CPT

## 2019-06-11 PROCEDURE — 80061 LIPID PANEL: CPT

## 2019-06-11 PROCEDURE — 84439 ASSAY OF FREE THYROXINE: CPT

## 2019-06-11 PROCEDURE — 84481 FREE ASSAY (FT-3): CPT

## 2019-07-29 ENCOUNTER — HOSPITAL ENCOUNTER (OUTPATIENT)
Dept: LAB | Age: 74
Discharge: HOME | End: 2019-07-29
Payer: MEDICARE

## 2019-07-29 DIAGNOSIS — E03.9 HYPOTHYROIDISM, UNSPECIFIED TYPE: ICD-10-CM

## 2019-07-29 LAB
T3 FREE: 2.76 PG/ML (ref 2.02–4.43)
THYROXINE, FREE: 1.11 NG/DL (ref 0.93–1.7)
TSH SERPL DL<=0.05 MIU/L-ACNC: 1.16 MIU/L (ref 0.3–5)

## 2019-07-29 PROCEDURE — 84443 ASSAY THYROID STIM HORMONE: CPT

## 2019-07-29 PROCEDURE — 84439 ASSAY OF FREE THYROXINE: CPT

## 2019-07-29 PROCEDURE — 36415 COLL VENOUS BLD VENIPUNCTURE: CPT

## 2019-07-29 PROCEDURE — 84481 FREE ASSAY (FT-3): CPT

## 2019-12-09 ENCOUNTER — HOSPITAL ENCOUNTER (OUTPATIENT)
Dept: WOMENS IMAGING | Age: 74
Discharge: HOME | End: 2019-12-11
Payer: MEDICARE

## 2019-12-09 ENCOUNTER — HOSPITAL ENCOUNTER (OUTPATIENT)
Age: 74
Discharge: HOME | End: 2019-12-11
Payer: MEDICARE

## 2019-12-09 ENCOUNTER — HOSPITAL ENCOUNTER (OUTPATIENT)
Age: 74
Discharge: HOME | End: 2019-12-09
Payer: MEDICARE

## 2019-12-09 DIAGNOSIS — I10 HYPERTENSION, UNSPECIFIED TYPE: ICD-10-CM

## 2019-12-09 DIAGNOSIS — Z12.31 BREAST CANCER SCREENING BY MAMMOGRAM: ICD-10-CM

## 2019-12-09 DIAGNOSIS — E03.9 HYPOTHYROIDISM, UNSPECIFIED TYPE: ICD-10-CM

## 2019-12-09 DIAGNOSIS — E78.2 MIXED HYPERLIPIDEMIA: ICD-10-CM

## 2019-12-09 LAB
ALT SERPL-CCNC: 17 U/L (ref 5–33)
ANION GAP SERPL CALCULATED.3IONS-SCNC: 10 MMOL/L (ref 9–17)
AST SERPL-CCNC: 26 U/L
BUN BLDV-MCNC: 16 MG/DL (ref 8–23)
BUN/CREAT BLD: 23 (ref 9–20)
CALCIUM SERPL-MCNC: 9.9 MG/DL (ref 8.6–10.4)
CHLORIDE BLD-SCNC: 96 MMOL/L (ref 98–107)
CHOLESTEROL/HDL RATIO: 2.5
CHOLESTEROL: 198 MG/DL
CO2: 28 MMOL/L (ref 20–31)
CREAT SERPL-MCNC: 0.71 MG/DL (ref 0.5–0.9)
GFR AFRICAN AMERICAN: >60 ML/MIN
GFR NON-AFRICAN AMERICAN: >60 ML/MIN
GFR SERPL CREATININE-BSD FRML MDRD: ABNORMAL ML/MIN/{1.73_M2}
GFR SERPL CREATININE-BSD FRML MDRD: ABNORMAL ML/MIN/{1.73_M2}
GLUCOSE BLD-MCNC: 93 MG/DL (ref 70–99)
HCT VFR BLD CALC: 43 % (ref 36.3–47.1)
HDLC SERPL-MCNC: 79 MG/DL
HEMOGLOBIN: 13.7 G/DL (ref 11.9–15.1)
LDL CHOLESTEROL: 105 MG/DL (ref 0–130)
MCH RBC QN AUTO: 30.8 PG (ref 25.2–33.5)
MCHC RBC AUTO-ENTMCNC: 31.9 G/DL (ref 28.4–34.8)
MCV RBC AUTO: 96.6 FL (ref 82.6–102.9)
NRBC AUTOMATED: 0 PER 100 WBC
PDW BLD-RTO: 12.1 % (ref 11.8–14.4)
PLATELET # BLD: 330 K/UL (ref 138–453)
PMV BLD AUTO: 8.5 FL (ref 8.1–13.5)
POTASSIUM SERPL-SCNC: 4.7 MMOL/L (ref 3.7–5.3)
RBC # BLD: 4.45 M/UL (ref 3.95–5.11)
SODIUM BLD-SCNC: 134 MMOL/L (ref 135–144)
T3 FREE: 2.55 PG/ML (ref 2.02–4.43)
THYROXINE, FREE: 1.19 NG/DL (ref 0.93–1.7)
TRIGL SERPL-MCNC: 70 MG/DL
TSH SERPL DL<=0.05 MIU/L-ACNC: 3.56 MIU/L (ref 0.3–5)
VLDLC SERPL CALC-MCNC: NORMAL MG/DL (ref 1–30)
WBC # BLD: 5.1 K/UL (ref 3.5–11.3)

## 2019-12-09 PROCEDURE — 77063 BREAST TOMOSYNTHESIS BI: CPT

## 2019-12-09 PROCEDURE — 84450 TRANSFERASE (AST) (SGOT): CPT

## 2019-12-09 PROCEDURE — 80048 BASIC METABOLIC PNL TOTAL CA: CPT

## 2019-12-09 PROCEDURE — 84439 ASSAY OF FREE THYROXINE: CPT

## 2019-12-09 PROCEDURE — 84481 FREE ASSAY (FT-3): CPT

## 2019-12-09 PROCEDURE — 84460 ALANINE AMINO (ALT) (SGPT): CPT

## 2019-12-09 PROCEDURE — 85027 COMPLETE CBC AUTOMATED: CPT

## 2019-12-09 PROCEDURE — 84443 ASSAY THYROID STIM HORMONE: CPT

## 2019-12-09 PROCEDURE — 36415 COLL VENOUS BLD VENIPUNCTURE: CPT

## 2019-12-09 PROCEDURE — 80061 LIPID PANEL: CPT

## 2020-02-04 ENCOUNTER — HOSPITAL ENCOUNTER (OUTPATIENT)
Age: 75
Discharge: HOME | End: 2020-02-06
Payer: MEDICARE

## 2020-02-04 ENCOUNTER — HOSPITAL ENCOUNTER (OUTPATIENT)
Dept: GENERAL RADIOLOGY | Age: 75
Discharge: HOME | End: 2020-02-06
Payer: MEDICARE

## 2020-02-04 PROCEDURE — 73140 X-RAY EXAM OF FINGER(S): CPT

## 2020-06-18 ENCOUNTER — HOSPITAL ENCOUNTER (OUTPATIENT)
Dept: LAB | Age: 75
Discharge: HOME | End: 2020-06-18
Payer: MEDICARE

## 2020-06-18 LAB
ALT SERPL-CCNC: 21 U/L (ref 5–33)
ANION GAP SERPL CALCULATED.3IONS-SCNC: 11 MMOL/L (ref 9–17)
AST SERPL-CCNC: 30 U/L
BUN BLDV-MCNC: 20 MG/DL (ref 8–23)
BUN/CREAT BLD: 26 (ref 9–20)
CALCIUM SERPL-MCNC: 9.5 MG/DL (ref 8.6–10.4)
CHLORIDE BLD-SCNC: 92 MMOL/L (ref 98–107)
CHOLESTEROL/HDL RATIO: 2.2
CHOLESTEROL: 177 MG/DL
CO2: 26 MMOL/L (ref 20–31)
CREAT SERPL-MCNC: 0.77 MG/DL (ref 0.5–0.9)
GFR AFRICAN AMERICAN: >60 ML/MIN
GFR NON-AFRICAN AMERICAN: >60 ML/MIN
GFR SERPL CREATININE-BSD FRML MDRD: ABNORMAL ML/MIN/{1.73_M2}
GFR SERPL CREATININE-BSD FRML MDRD: ABNORMAL ML/MIN/{1.73_M2}
GLUCOSE BLD-MCNC: 99 MG/DL (ref 70–99)
HCT VFR BLD CALC: 40.6 % (ref 36.3–47.1)
HDLC SERPL-MCNC: 80 MG/DL
HEMOGLOBIN: 13.2 G/DL (ref 11.9–15.1)
LDL CHOLESTEROL: 85 MG/DL (ref 0–130)
MCH RBC QN AUTO: 31.2 PG (ref 25.2–33.5)
MCHC RBC AUTO-ENTMCNC: 32.5 G/DL (ref 28.4–34.8)
MCV RBC AUTO: 96 FL (ref 82.6–102.9)
NRBC AUTOMATED: 0 PER 100 WBC
PDW BLD-RTO: 11.8 % (ref 11.8–14.4)
PLATELET # BLD: 318 K/UL (ref 138–453)
PMV BLD AUTO: 7.8 FL (ref 8.1–13.5)
POTASSIUM SERPL-SCNC: 5.4 MMOL/L (ref 3.7–5.3)
RBC # BLD: 4.23 M/UL (ref 3.95–5.11)
SODIUM BLD-SCNC: 129 MMOL/L (ref 135–144)
T3 FREE: 2.69 PG/ML (ref 2.02–4.43)
THYROXINE, FREE: 1.3 NG/DL (ref 0.93–1.7)
TRIGL SERPL-MCNC: 58 MG/DL
TSH SERPL DL<=0.05 MIU/L-ACNC: 1.01 MIU/L (ref 0.3–5)
VLDLC SERPL CALC-MCNC: NORMAL MG/DL (ref 1–30)
WBC # BLD: 3.8 K/UL (ref 3.5–11.3)

## 2020-06-18 PROCEDURE — 84439 ASSAY OF FREE THYROXINE: CPT

## 2020-06-18 PROCEDURE — 80061 LIPID PANEL: CPT

## 2020-06-18 PROCEDURE — 84481 FREE ASSAY (FT-3): CPT

## 2020-06-18 PROCEDURE — 84450 TRANSFERASE (AST) (SGOT): CPT

## 2020-06-18 PROCEDURE — 80048 BASIC METABOLIC PNL TOTAL CA: CPT

## 2020-06-18 PROCEDURE — 84460 ALANINE AMINO (ALT) (SGPT): CPT

## 2020-06-18 PROCEDURE — 84443 ASSAY THYROID STIM HORMONE: CPT

## 2020-06-18 PROCEDURE — 85027 COMPLETE CBC AUTOMATED: CPT

## 2020-06-18 PROCEDURE — 36415 COLL VENOUS BLD VENIPUNCTURE: CPT

## 2020-12-15 ENCOUNTER — HOSPITAL ENCOUNTER (OUTPATIENT)
Dept: LAB | Age: 75
Discharge: HOME | End: 2020-12-15
Payer: MEDICARE

## 2020-12-15 LAB
-: ABNORMAL
ALT SERPL-CCNC: 17 U/L (ref 5–33)
AMORPHOUS: ABNORMAL
ANION GAP SERPL CALCULATED.3IONS-SCNC: 10 MMOL/L (ref 9–17)
AST SERPL-CCNC: 26 U/L
BACTERIA: ABNORMAL
BILIRUBIN URINE: NEGATIVE
BUN BLDV-MCNC: 21 MG/DL (ref 8–23)
BUN/CREAT BLD: 29 (ref 9–20)
CALCIUM SERPL-MCNC: 9.6 MG/DL (ref 8.6–10.4)
CASTS UA: ABNORMAL /LPF
CHLORIDE BLD-SCNC: 100 MMOL/L (ref 98–107)
CHOLESTEROL/HDL RATIO: 2.7
CHOLESTEROL: 205 MG/DL
CO2: 26 MMOL/L (ref 20–31)
COLOR: YELLOW
COMMENT UA: ABNORMAL
CREAT SERPL-MCNC: 0.73 MG/DL (ref 0.5–0.9)
CRYSTALS, UA: ABNORMAL /HPF
EPITHELIAL CELLS UA: ABNORMAL /HPF (ref 0–25)
GFR AFRICAN AMERICAN: >60 ML/MIN
GFR NON-AFRICAN AMERICAN: >60 ML/MIN
GFR SERPL CREATININE-BSD FRML MDRD: ABNORMAL ML/MIN/{1.73_M2}
GFR SERPL CREATININE-BSD FRML MDRD: ABNORMAL ML/MIN/{1.73_M2}
GLUCOSE BLD-MCNC: 87 MG/DL (ref 70–99)
GLUCOSE URINE: NEGATIVE
HCT VFR BLD CALC: 42.8 % (ref 36.3–47.1)
HDLC SERPL-MCNC: 76 MG/DL
HEMOGLOBIN: 13.7 G/DL (ref 11.9–15.1)
KETONES, URINE: NEGATIVE
LDL CHOLESTEROL: 113 MG/DL (ref 0–130)
LEUKOCYTE ESTERASE, URINE: NEGATIVE
MCH RBC QN AUTO: 30.6 PG (ref 25.2–33.5)
MCHC RBC AUTO-ENTMCNC: 32 G/DL (ref 28.4–34.8)
MCV RBC AUTO: 95.7 FL (ref 82.6–102.9)
MUCUS: ABNORMAL
NITRITE, URINE: NEGATIVE
NRBC AUTOMATED: 0 PER 100 WBC
OTHER OBSERVATIONS UA: ABNORMAL
PDW BLD-RTO: 12.4 % (ref 11.8–14.4)
PH UA: 6 (ref 5–9)
PLATELET # BLD: 314 K/UL (ref 138–453)
PMV BLD AUTO: 8.6 FL (ref 8.1–13.5)
POTASSIUM SERPL-SCNC: 4.6 MMOL/L (ref 3.7–5.3)
PROTEIN UA: NEGATIVE
RBC # BLD: 4.47 M/UL (ref 3.95–5.11)
RBC UA: ABNORMAL /HPF (ref 0–2)
RENAL EPITHELIAL, UA: ABNORMAL /HPF
SODIUM BLD-SCNC: 136 MMOL/L (ref 135–144)
SPECIFIC GRAVITY UA: 1.02 (ref 1.01–1.02)
T3 FREE: 2.55 PG/ML (ref 2.02–4.43)
THYROXINE, FREE: 1.05 NG/DL (ref 0.93–1.7)
TRICHOMONAS: ABNORMAL
TRIGL SERPL-MCNC: 80 MG/DL
TSH SERPL DL<=0.05 MIU/L-ACNC: 1.5 MIU/L (ref 0.3–5)
TURBIDITY: CLEAR
URINE HGB: NEGATIVE
UROBILINOGEN, URINE: NORMAL
VLDLC SERPL CALC-MCNC: ABNORMAL MG/DL (ref 1–30)
WBC # BLD: 4.8 K/UL (ref 3.5–11.3)
WBC UA: ABNORMAL /HPF (ref 0–5)
YEAST: ABNORMAL

## 2020-12-15 PROCEDURE — 84481 FREE ASSAY (FT-3): CPT

## 2020-12-15 PROCEDURE — 81001 URINALYSIS AUTO W/SCOPE: CPT

## 2020-12-15 PROCEDURE — 84460 ALANINE AMINO (ALT) (SGPT): CPT

## 2020-12-15 PROCEDURE — 84450 TRANSFERASE (AST) (SGOT): CPT

## 2020-12-15 PROCEDURE — 84443 ASSAY THYROID STIM HORMONE: CPT

## 2020-12-15 PROCEDURE — 85027 COMPLETE CBC AUTOMATED: CPT

## 2020-12-15 PROCEDURE — 80048 BASIC METABOLIC PNL TOTAL CA: CPT

## 2020-12-15 PROCEDURE — 84439 ASSAY OF FREE THYROXINE: CPT

## 2020-12-15 PROCEDURE — 36415 COLL VENOUS BLD VENIPUNCTURE: CPT

## 2020-12-15 PROCEDURE — 80061 LIPID PANEL: CPT

## 2021-06-21 ENCOUNTER — HOSPITAL ENCOUNTER (OUTPATIENT)
Age: 76
Discharge: HOME | End: 2021-06-21
Payer: MEDICARE

## 2021-06-21 DIAGNOSIS — I10 ESSENTIAL HYPERTENSION, BENIGN: ICD-10-CM

## 2021-06-21 DIAGNOSIS — E03.9 HYPOTHYROIDISM, UNSPECIFIED TYPE: ICD-10-CM

## 2021-06-21 DIAGNOSIS — E78.2 MIXED HYPERLIPIDEMIA: ICD-10-CM

## 2021-06-21 LAB
ALT SERPL-CCNC: 18 U/L (ref 5–33)
ANION GAP SERPL CALCULATED.3IONS-SCNC: 9 MMOL/L (ref 9–17)
AST SERPL-CCNC: 26 U/L
BUN BLDV-MCNC: 15 MG/DL (ref 8–23)
BUN/CREAT BLD: 19 (ref 9–20)
CALCIUM SERPL-MCNC: 9.7 MG/DL (ref 8.6–10.4)
CHLORIDE BLD-SCNC: 101 MMOL/L (ref 98–107)
CHOLESTEROL/HDL RATIO: 2.6
CHOLESTEROL: 189 MG/DL
CO2: 28 MMOL/L (ref 20–31)
CREAT SERPL-MCNC: 0.78 MG/DL (ref 0.5–0.9)
GFR AFRICAN AMERICAN: >60 ML/MIN
GFR NON-AFRICAN AMERICAN: >60 ML/MIN
GFR SERPL CREATININE-BSD FRML MDRD: NORMAL ML/MIN/{1.73_M2}
GFR SERPL CREATININE-BSD FRML MDRD: NORMAL ML/MIN/{1.73_M2}
GLUCOSE BLD-MCNC: 96 MG/DL (ref 70–99)
HCT VFR BLD CALC: 42.3 % (ref 36.3–47.1)
HDLC SERPL-MCNC: 74 MG/DL
HEMOGLOBIN: 13.7 G/DL (ref 11.9–15.1)
LDL CHOLESTEROL: 98 MG/DL (ref 0–130)
MCH RBC QN AUTO: 30.9 PG (ref 25.2–33.5)
MCHC RBC AUTO-ENTMCNC: 32.4 G/DL (ref 28.4–34.8)
MCV RBC AUTO: 95.3 FL (ref 82.6–102.9)
NRBC AUTOMATED: 0 PER 100 WBC
PDW BLD-RTO: 12.5 % (ref 11.8–14.4)
PLATELET # BLD: 302 K/UL (ref 138–453)
PMV BLD AUTO: 8.6 FL (ref 8.1–13.5)
POTASSIUM SERPL-SCNC: 4.9 MMOL/L (ref 3.7–5.3)
RBC # BLD: 4.44 M/UL (ref 3.95–5.11)
SODIUM BLD-SCNC: 138 MMOL/L (ref 135–144)
T3 FREE: 2.64 PG/ML (ref 2.02–4.43)
THYROXINE, FREE: 1.23 NG/DL (ref 0.93–1.7)
TRIGL SERPL-MCNC: 85 MG/DL
TSH SERPL DL<=0.05 MIU/L-ACNC: 1.5 MIU/L (ref 0.3–5)
VLDLC SERPL CALC-MCNC: NORMAL MG/DL (ref 1–30)
WBC # BLD: 5.2 K/UL (ref 3.5–11.3)

## 2021-06-21 PROCEDURE — 80048 BASIC METABOLIC PNL TOTAL CA: CPT

## 2021-06-21 PROCEDURE — 84450 TRANSFERASE (AST) (SGOT): CPT

## 2021-06-21 PROCEDURE — 84439 ASSAY OF FREE THYROXINE: CPT

## 2021-06-21 PROCEDURE — 84460 ALANINE AMINO (ALT) (SGPT): CPT

## 2021-06-21 PROCEDURE — 84481 FREE ASSAY (FT-3): CPT

## 2021-06-21 PROCEDURE — 80061 LIPID PANEL: CPT

## 2021-06-21 PROCEDURE — 36415 COLL VENOUS BLD VENIPUNCTURE: CPT

## 2021-06-21 PROCEDURE — 84443 ASSAY THYROID STIM HORMONE: CPT

## 2021-06-21 PROCEDURE — 85027 COMPLETE CBC AUTOMATED: CPT

## 2021-07-01 PROBLEM — J44.9 CHRONIC OBSTRUCTIVE PULMONARY DISEASE, UNSPECIFIED COPD TYPE (HCC): Status: ACTIVE | Noted: 2021-07-01

## 2021-07-02 ENCOUNTER — HOSPITAL ENCOUNTER (OUTPATIENT)
Dept: WOMENS IMAGING | Age: 76
Discharge: HOME | End: 2021-07-04
Payer: MEDICARE

## 2021-07-02 DIAGNOSIS — Z12.31 ENCOUNTER FOR SCREENING MAMMOGRAM FOR MALIGNANT NEOPLASM OF BREAST: ICD-10-CM

## 2021-07-02 PROCEDURE — 77063 BREAST TOMOSYNTHESIS BI: CPT

## 2021-12-20 ENCOUNTER — HOSPITAL ENCOUNTER (OUTPATIENT)
Age: 76
Discharge: HOME | End: 2021-12-20
Payer: MEDICARE

## 2021-12-20 DIAGNOSIS — E03.9 HYPOTHYROIDISM, UNSPECIFIED TYPE: ICD-10-CM

## 2021-12-20 DIAGNOSIS — E78.2 MIXED HYPERLIPIDEMIA: ICD-10-CM

## 2021-12-20 DIAGNOSIS — I10 ESSENTIAL HYPERTENSION, BENIGN: ICD-10-CM

## 2021-12-20 LAB
ALT SERPL-CCNC: 17 U/L (ref 5–33)
ANION GAP SERPL CALCULATED.3IONS-SCNC: 12 MMOL/L (ref 9–17)
AST SERPL-CCNC: 23 U/L
BUN BLDV-MCNC: 19 MG/DL (ref 8–23)
BUN/CREAT BLD: 27 (ref 9–20)
CALCIUM SERPL-MCNC: 9.2 MG/DL (ref 8.6–10.4)
CHLORIDE BLD-SCNC: 101 MMOL/L (ref 98–107)
CHOLESTEROL/HDL RATIO: 2.7
CHOLESTEROL: 206 MG/DL
CO2: 25 MMOL/L (ref 20–31)
CREAT SERPL-MCNC: 0.71 MG/DL (ref 0.5–0.9)
GFR AFRICAN AMERICAN: >60 ML/MIN
GFR NON-AFRICAN AMERICAN: >60 ML/MIN
GFR SERPL CREATININE-BSD FRML MDRD: ABNORMAL ML/MIN/{1.73_M2}
GFR SERPL CREATININE-BSD FRML MDRD: ABNORMAL ML/MIN/{1.73_M2}
GLUCOSE BLD-MCNC: 93 MG/DL (ref 70–99)
HCT VFR BLD CALC: 43.2 % (ref 36.3–47.1)
HDLC SERPL-MCNC: 75 MG/DL
HEMOGLOBIN: 14 G/DL (ref 11.9–15.1)
LDL CHOLESTEROL: 115 MG/DL (ref 0–130)
MCH RBC QN AUTO: 30.7 PG (ref 25.2–33.5)
MCHC RBC AUTO-ENTMCNC: 32.4 G/DL (ref 28.4–34.8)
MCV RBC AUTO: 94.7 FL (ref 82.6–102.9)
NRBC AUTOMATED: 0 PER 100 WBC
PDW BLD-RTO: 12.3 % (ref 11.8–14.4)
PLATELET # BLD: 287 K/UL (ref 138–453)
PMV BLD AUTO: 8.7 FL (ref 8.1–13.5)
POTASSIUM SERPL-SCNC: 3.8 MMOL/L (ref 3.7–5.3)
RBC # BLD: 4.56 M/UL (ref 3.95–5.11)
SODIUM BLD-SCNC: 138 MMOL/L (ref 135–144)
T3 FREE: 2.48 PG/ML (ref 2.02–4.43)
THYROXINE, FREE: 1.11 NG/DL (ref 0.93–1.7)
TRIGL SERPL-MCNC: 78 MG/DL
TSH SERPL DL<=0.05 MIU/L-ACNC: 2.12 MIU/L (ref 0.3–5)
VLDLC SERPL CALC-MCNC: ABNORMAL MG/DL (ref 1–30)
WBC # BLD: 4.6 K/UL (ref 3.5–11.3)

## 2021-12-20 PROCEDURE — 85027 COMPLETE CBC AUTOMATED: CPT

## 2021-12-20 PROCEDURE — 84443 ASSAY THYROID STIM HORMONE: CPT

## 2021-12-20 PROCEDURE — 36415 COLL VENOUS BLD VENIPUNCTURE: CPT

## 2021-12-20 PROCEDURE — 84460 ALANINE AMINO (ALT) (SGPT): CPT

## 2021-12-20 PROCEDURE — 84450 TRANSFERASE (AST) (SGOT): CPT

## 2021-12-20 PROCEDURE — 84439 ASSAY OF FREE THYROXINE: CPT

## 2021-12-20 PROCEDURE — 84481 FREE ASSAY (FT-3): CPT

## 2021-12-20 PROCEDURE — 80061 LIPID PANEL: CPT

## 2021-12-20 PROCEDURE — 80048 BASIC METABOLIC PNL TOTAL CA: CPT

## 2022-01-19 ENCOUNTER — HOSPITAL ENCOUNTER (EMERGENCY)
Age: 77
Discharge: HOME | End: 2022-01-19
Payer: MEDICARE

## 2022-01-19 VITALS
RESPIRATION RATE: 19 BRPM | WEIGHT: 155 LBS | HEIGHT: 62 IN | HEART RATE: 64 BPM | DIASTOLIC BLOOD PRESSURE: 90 MMHG | SYSTOLIC BLOOD PRESSURE: 158 MMHG | TEMPERATURE: 97.4 F | BODY MASS INDEX: 28.52 KG/M2 | OXYGEN SATURATION: 94 %

## 2022-01-19 DIAGNOSIS — R03.0 ELEVATED BLOOD PRESSURE READING: Primary | ICD-10-CM

## 2022-01-19 LAB
ABSOLUTE EOS #: 0.14 K/UL (ref 0–0.44)
ABSOLUTE IMMATURE GRANULOCYTE: <0.03 K/UL (ref 0–0.3)
ABSOLUTE LYMPH #: 1.33 K/UL (ref 1.1–3.7)
ABSOLUTE MONO #: 0.44 K/UL (ref 0.1–1.2)
ALBUMIN SERPL-MCNC: 4.5 G/DL (ref 3.5–5.2)
ALBUMIN/GLOBULIN RATIO: 1.4 (ref 1–2.5)
ALP BLD-CCNC: 78 U/L (ref 35–104)
ALT SERPL-CCNC: 18 U/L (ref 5–33)
ANION GAP SERPL CALCULATED.3IONS-SCNC: 14 MMOL/L (ref 9–17)
AST SERPL-CCNC: 25 U/L
BASOPHILS # BLD: 1 % (ref 0–2)
BASOPHILS ABSOLUTE: 0.06 K/UL (ref 0–0.2)
BILIRUB SERPL-MCNC: 0.32 MG/DL (ref 0.3–1.2)
BUN BLDV-MCNC: 12 MG/DL (ref 8–23)
BUN/CREAT BLD: 19 (ref 9–20)
CALCIUM SERPL-MCNC: 9.6 MG/DL (ref 8.6–10.4)
CHLORIDE BLD-SCNC: 98 MMOL/L (ref 98–107)
CO2: 25 MMOL/L (ref 20–31)
CREAT SERPL-MCNC: 0.64 MG/DL (ref 0.5–0.9)
DIFFERENTIAL TYPE: NORMAL
EOSINOPHILS RELATIVE PERCENT: 3 % (ref 1–4)
GFR AFRICAN AMERICAN: >60 ML/MIN
GFR NON-AFRICAN AMERICAN: >60 ML/MIN
GFR SERPL CREATININE-BSD FRML MDRD: ABNORMAL ML/MIN/{1.73_M2}
GFR SERPL CREATININE-BSD FRML MDRD: ABNORMAL ML/MIN/{1.73_M2}
GLUCOSE BLD-MCNC: 102 MG/DL (ref 70–99)
HCT VFR BLD CALC: 42.6 % (ref 36.3–47.1)
HEMOGLOBIN: 13.9 G/DL (ref 11.9–15.1)
IMMATURE GRANULOCYTES: 0 %
LYMPHOCYTES # BLD: 26 % (ref 24–43)
MCH RBC QN AUTO: 30.3 PG (ref 25.2–33.5)
MCHC RBC AUTO-ENTMCNC: 32.6 G/DL (ref 28.4–34.8)
MCV RBC AUTO: 92.8 FL (ref 82.6–102.9)
MONOCYTES # BLD: 9 % (ref 3–12)
NRBC AUTOMATED: 0 PER 100 WBC
PDW BLD-RTO: 12.9 % (ref 11.8–14.4)
PLATELET # BLD: 310 K/UL (ref 138–453)
PLATELET ESTIMATE: NORMAL
PMV BLD AUTO: 8.7 FL (ref 8.1–13.5)
POTASSIUM SERPL-SCNC: 3.8 MMOL/L (ref 3.7–5.3)
RBC # BLD: 4.59 M/UL (ref 3.95–5.11)
RBC # BLD: NORMAL 10*6/UL
SARS-COV-2, RAPID: NOT DETECTED
SEG NEUTROPHILS: 61 % (ref 36–65)
SEGMENTED NEUTROPHILS ABSOLUTE COUNT: 3.05 K/UL (ref 1.5–8.1)
SODIUM BLD-SCNC: 137 MMOL/L (ref 135–144)
SPECIMEN DESCRIPTION: NORMAL
TOTAL PROTEIN: 7.8 G/DL (ref 6.4–8.3)
TROPONIN INTERP: NORMAL
TROPONIN T: NORMAL NG/ML
TROPONIN, HIGH SENSITIVITY: 9 NG/L (ref 0–14)
WBC # BLD: 5 K/UL (ref 3.5–11.3)
WBC # BLD: NORMAL 10*3/UL

## 2022-01-19 PROCEDURE — 93005 ELECTROCARDIOGRAM TRACING: CPT | Performed by: FAMILY MEDICINE

## 2022-01-19 PROCEDURE — 87635 SARS-COV-2 COVID-19 AMP PRB: CPT

## 2022-01-19 PROCEDURE — C9803 HOPD COVID-19 SPEC COLLECT: HCPCS

## 2022-01-19 PROCEDURE — 80053 COMPREHEN METABOLIC PANEL: CPT

## 2022-01-19 PROCEDURE — 99283 EMERGENCY DEPT VISIT LOW MDM: CPT

## 2022-01-19 PROCEDURE — 36415 COLL VENOUS BLD VENIPUNCTURE: CPT

## 2022-01-19 PROCEDURE — 85025 COMPLETE CBC W/AUTO DIFF WBC: CPT

## 2022-01-19 PROCEDURE — 84484 ASSAY OF TROPONIN QUANT: CPT

## 2022-01-19 RX ORDER — HYDRALAZINE HYDROCHLORIDE 20 MG/ML
5 INJECTION INTRAMUSCULAR; INTRAVENOUS EVERY 6 HOURS PRN
Status: DISCONTINUED | OUTPATIENT
Start: 2022-01-19 | End: 2022-01-19 | Stop reason: HOSPADM

## 2022-01-19 NOTE — ED PROVIDER NOTES
[facility]  EMERGENCY DEPARTMENT ENCOUNTER      Pt Name: Dari Brennan  MRN: [id number]  [place] [id number]  Date of evaluation: 1/19/2022  Provider: CAROLIN Lieberman CNP    CHIEF COMPLAINT       Chief Complaint   Patient presents with    Hypertension     Pt states her blood pressure was elevated last PM and has been high all day.  Dizziness     With standing, onset this AM         HISTORY OF PRESENT ILLNESS   (Location/Symptom, Timing/Onset, Context/Setting, Quality, Duration, Modifying Factors, Severity)  Note limiting factors. Dari Brennan is a 77 y.o. female past medical history significant for hypertension, hypothyroidism presents emergency department with complaints of nasal congestion and elevated blood pressure since last evening. Patient denies headache or fever. Reports no neck pain. Denies sore throat difficulty swallowing. No chest pain, cough or difficulty breathing. No abdominal pain, vomiting, diarrhea or dysuria. No known sick contacts. Patient reports she is fully vaccinated against COVID-19. HPI    Nursing Notes were reviewed. REVIEW OF SYSTEMS    (2-9 systems for level 4, 10 or more for level 5)     Review of Systems    Except as noted above the remainder of the review of systems was reviewed and negative.        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 Nayeli Martin at [facility] - **update: got the \"all clear\" in 4/2019 and only has to f/u PRN now    History of vertebral compression fracture 2007    s/p vertebroplasty    Hyperlipidemia     Hypertension     Hypothyroidism     Lumbar degenerative disc disease  Restless legs syndrome (RLS)          SURGICAL HISTORY       Past Surgical History:   Procedure Laterality Date    APPENDECTOMY  195    BACK SURGERY      L4-5 and L5-S1 decompression- dr Joe Monroy  2016    [facility] - left cerebellopontine angle tumor excision via left translabyrinthine approach    TUBAL LIGATION      VERTEBROPLASTY  2007    3 levels - [facility]         CURRENT MEDICATIONS       Previous Medications    AMLODIPINE (NORVASC) 5 MG TABLET    Take 1 tablet by mouth twice daily    ASCORBIC ACID (VITAMIN C PO)    Take by mouth    CHOLECALCIFEROL (VITAMIN D) 125 MCG (5000 UT) CAPS    Take by mouth    CLONAZEPAM (KLONOPIN) 1 MG TABLET    Take 1 tablet by mouth nightly as needed for Anxiety for up to 180 days. FLUTICASONE-SALMETEROL (ADVAIR DISKUS) 250-50 MCG/DOSE AEPB    Inhale 1 puff into the lungs 2 times daily    LEVOTHYROXINE (SYNTHROID) 112 MCG TABLET    Take 1 tablet by mouth Daily    OMEGA-3 FATTY ACIDS (FISH OIL) 1000 MG CAPS    Take 3,000 mg by mouth 3 times daily    PROBIOTIC PRODUCT (PROBIOTIC COMPLEX ACIDOPHILUS) CAPS    Take by mouth    SERTRALINE (ZOLOFT) 100 MG TABLET    Take 1.5 tablets by mouth daily       ALLERGIES     Ibuprofen    FAMILY HISTORY     History reviewed. No pertinent family history. SOCIAL HISTORY       Social History     Socioeconomic History    Marital status:      Spouse name: None    Number of children: None    Years of education: None    Highest education level: None   Occupational History    None   Tobacco Use    Smoking status: Former Smoker     Packs/day: 1.50     Years: 25.00     Pack years: 37.50     Quit date: 2000     Years since quittin.0    Smokeless tobacco: Never Used   Substance and Sexual Activity    Alcohol use: Yes     Comment: occ.     Drug use: None    Sexual activity: None   Other Topics Concern    None   Social History Narrative    None     Social Determinants of Health     Financial Resource exudate present. Neck exam: Supple no lymphadenopathy, trachea midline. Chest exam: No audible wheezing. No increased respiratory effort. Heart: Normal heart rate and rhythm. No murmur. Abdomen: Soft, nontender, nondistended. Back: No midline tenderness. No CVA tenderness. Extremities: Patient moving all extremities or difficulty. Intact distal pulses and sensation. Neurologic: Alert and oriented x3. Able to make informed decisions. Skin exam: Clean dry and intact. DIAGNOSTIC RESULTS     EKG: All EKG's are interpreted by the Emergency Department Physician who either signs or Co-signs this chart in the absence of a cardiologist.  EKG obtained, reviewed interpreted by myself revealing normal sinus rhythm with sinus arrhythmia. Ventricular rate 69 bpm, AL interval 138 ms, QRS duration 96 ms and corrected  ms. No ST segment elevation or depression identified. No STEMI. RADIOLOGY:   Non-plain film images such as CT, Ultrasound and MRI are read by the radiologist. Plain radiographic images are visualized and preliminarily interpreted by the emergency physician with the below findings:        Interpretation per the Radiologist below, if available at the time of this note:    No orders to display         ED BEDSIDE ULTRASOUND:   Performed by ED Physician - none    LABS:  Labs Reviewed   COMPREHENSIVE METABOLIC PANEL - Abnormal; Notable for the following components:       Result Value    Glucose 102 (*)     All other components within normal limits   COVID-19, RAPID   CBC WITH AUTO DIFFERENTIAL   TROPONIN       All other labs were within normal range or not returned as of this dictation.     EMERGENCY DEPARTMENT COURSE and DIFFERENTIAL DIAGNOSIS/MDM:   Vitals:    Vitals:    01/19/22 1758 01/19/22 1801 01/19/22 1818 01/19/22 1833   BP:  (!) 182/99 (!) 177/90 (!) 158/90   Pulse:  56 55 64   Resp:  10 14 19   Temp:       TempSrc:       SpO2: 94% 95% 94% 94%   Weight:       Height: DULCE Carter is a 77 y.o. female past medical history significant for hypertension, hypothyroidism presents emergency department with complaints of nasal congestion and elevated blood pressure since last evening. Patient denies headache or fever. Reports no neck pain. Denies sore throat difficulty swallowing. No chest pain, cough or difficulty breathing. No abdominal pain, vomiting, diarrhea or dysuria. No known sick contacts. Patient reports she is fully vaccinated against COVID-19. Exam remarkable for alert, interactive, hydrated appearing 77year-old in no acute distress. HEENT unremarkable. Lungs no audible wheezing. She has no increased respiratory effort. Heart is normal rate and rhythm. Abdomen benign. She is moving all extremities out difficulty. Blood pressure at triage 146/91. Patient was afebrile. Satting 98% on room air. CBC and chemistries unremarkable. Troponin unremarkable. COVID testing negative. Patient was given 5 mg of Apresoline IV. She was given her nighttime blood pressure medication and anxiety medication. Blood pressures slowly titrated downward. Patient feeling improved. She is discharged home with instructions to follow-up with PCP for reevaluation in the next couple of days. Continue to take her home medications as prescribed. Reduce her salt intake. Return for increased pain, fever, difficulty breathing, vomiting or new or worsening signs or symptoms. REASSESSMENT          CRITICAL CARE TIME       CONSULTS:  None    PROCEDURES:  Unless otherwise noted below, none     Procedures        FINAL IMPRESSION      1.  Elevated blood pressure reading Stable         DISPOSITION/PLAN   DISPOSITION Decision To Discharge 01/19/2022 07:27:46 PM      PATIENT REFERRED TO:  Eran Ferris MD  [address] [address]  [phone]    In 2 days  for re-evaluation      DISCHARGE MEDICATIONS:  New Prescriptions    No medications on file     Controlled Substances Monitoring:     No flowsheet data found.     (Please note that portions of this note were completed with a voice recognition program.  Efforts were made to edit the dictations but occasionally words are mis-transcribed.)    CAROLIN Nicole CNP (electronically signed)  Attending Emergency Physician           CARLOIN Nicole CNP  01/19/22 1953

## 2022-01-20 LAB
EKG ATRIAL RATE: 69 BPM
EKG P AXIS: 26 DEGREES
EKG P-R INTERVAL: 138 MS
EKG Q-T INTERVAL: 478 MS
EKG QRS DURATION: 96 MS
EKG QTC CALCULATION (BAZETT): 512 MS
EKG R AXIS: -32 DEGREES
EKG T AXIS: 8 DEGREES
EKG VENTRICULAR RATE: 69 BPM

## 2022-01-20 PROCEDURE — 93010 ELECTROCARDIOGRAM REPORT: CPT | Performed by: INTERNAL MEDICINE

## 2022-06-21 ENCOUNTER — HOSPITAL ENCOUNTER (OUTPATIENT)
Age: 77
Discharge: HOME | End: 2022-06-21
Payer: MEDICARE

## 2022-06-21 DIAGNOSIS — E03.9 HYPOTHYROIDISM, UNSPECIFIED TYPE: ICD-10-CM

## 2022-06-21 DIAGNOSIS — E78.2 MIXED HYPERLIPIDEMIA: ICD-10-CM

## 2022-06-21 DIAGNOSIS — I10 ESSENTIAL HYPERTENSION, BENIGN: ICD-10-CM

## 2022-06-21 LAB
ALT SERPL-CCNC: 18 U/L (ref 5–33)
ANION GAP SERPL CALCULATED.3IONS-SCNC: 11 MMOL/L (ref 9–17)
AST SERPL-CCNC: 27 U/L
BUN BLDV-MCNC: 21 MG/DL (ref 8–23)
BUN/CREAT BLD: 27 (ref 9–20)
CALCIUM SERPL-MCNC: 9.2 MG/DL (ref 8.6–10.4)
CHLORIDE BLD-SCNC: 103 MMOL/L (ref 98–107)
CHOLESTEROL/HDL RATIO: 2.8
CHOLESTEROL: 211 MG/DL
CO2: 26 MMOL/L (ref 20–31)
CREAT SERPL-MCNC: 0.79 MG/DL (ref 0.5–0.9)
GFR AFRICAN AMERICAN: >60 ML/MIN
GFR NON-AFRICAN AMERICAN: >60 ML/MIN
GFR SERPL CREATININE-BSD FRML MDRD: ABNORMAL ML/MIN/{1.73_M2}
GFR SERPL CREATININE-BSD FRML MDRD: ABNORMAL ML/MIN/{1.73_M2}
GLUCOSE BLD-MCNC: 91 MG/DL (ref 70–99)
HCT VFR BLD CALC: 41.5 % (ref 36.3–47.1)
HDLC SERPL-MCNC: 75 MG/DL
HEMOGLOBIN: 13.5 G/DL (ref 11.9–15.1)
LDL CHOLESTEROL: 117 MG/DL (ref 0–130)
MCH RBC QN AUTO: 31.3 PG (ref 25.2–33.5)
MCHC RBC AUTO-ENTMCNC: 32.5 G/DL (ref 28.4–34.8)
MCV RBC AUTO: 96.1 FL (ref 82.6–102.9)
NRBC AUTOMATED: 0 PER 100 WBC
PDW BLD-RTO: 12.1 % (ref 11.8–14.4)
PLATELET # BLD: 292 K/UL (ref 138–453)
PMV BLD AUTO: 9.1 FL (ref 8.1–13.5)
POTASSIUM SERPL-SCNC: 3.7 MMOL/L (ref 3.7–5.3)
RBC # BLD: 4.32 M/UL (ref 3.95–5.11)
SODIUM BLD-SCNC: 140 MMOL/L (ref 135–144)
T3 FREE: 2.64 PG/ML (ref 2.02–4.43)
THYROXINE, FREE: 1.13 NG/DL (ref 0.93–1.7)
TRIGL SERPL-MCNC: 94 MG/DL
TSH SERPL DL<=0.05 MIU/L-ACNC: 2.75 UIU/ML (ref 0.3–5)
WBC # BLD: 3.9 K/UL (ref 3.5–11.3)

## 2022-06-21 PROCEDURE — 85027 COMPLETE CBC AUTOMATED: CPT

## 2022-06-21 PROCEDURE — 80048 BASIC METABOLIC PNL TOTAL CA: CPT

## 2022-06-21 PROCEDURE — 84439 ASSAY OF FREE THYROXINE: CPT

## 2022-06-21 PROCEDURE — 84450 TRANSFERASE (AST) (SGOT): CPT

## 2022-06-21 PROCEDURE — 84481 FREE ASSAY (FT-3): CPT

## 2022-06-21 PROCEDURE — 36415 COLL VENOUS BLD VENIPUNCTURE: CPT

## 2022-06-21 PROCEDURE — 84443 ASSAY THYROID STIM HORMONE: CPT

## 2022-06-21 PROCEDURE — 84460 ALANINE AMINO (ALT) (SGPT): CPT

## 2022-06-21 PROCEDURE — 80061 LIPID PANEL: CPT

## 2022-07-05 ENCOUNTER — HOSPITAL ENCOUNTER (OUTPATIENT)
Dept: WOMENS IMAGING | Age: 77
Discharge: HOME | End: 2022-07-07
Payer: MEDICARE

## 2022-07-05 DIAGNOSIS — Z12.39 SCREENING BREAST EXAMINATION: ICD-10-CM

## 2022-07-05 PROCEDURE — 77063 BREAST TOMOSYNTHESIS BI: CPT

## 2022-12-20 ENCOUNTER — HOSPITAL ENCOUNTER (OUTPATIENT)
Age: 77
Discharge: HOME | End: 2022-12-22
Payer: MEDICARE

## 2022-12-20 ENCOUNTER — HOSPITAL ENCOUNTER (OUTPATIENT)
Dept: GENERAL RADIOLOGY | Age: 77
Discharge: HOME | End: 2022-12-22
Payer: MEDICARE

## 2022-12-20 DIAGNOSIS — M25.531 RIGHT WRIST PAIN: ICD-10-CM
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
## 2022-12-20 PROCEDURE — 73090 X-RAY EXAM OF FOREARM: CPT

## 2022-12-29 ENCOUNTER — HOSPITAL ENCOUNTER (OUTPATIENT)
Age: 77
Discharge: HOME | End: 2022-12-29
Payer: MEDICARE

## 2022-12-29 DIAGNOSIS — E78.2 MIXED HYPERLIPIDEMIA: ICD-10-CM

## 2022-12-29 DIAGNOSIS — E03.9 HYPOTHYROIDISM, UNSPECIFIED TYPE: ICD-10-CM

## 2022-12-29 DIAGNOSIS — I10 ESSENTIAL HYPERTENSION, BENIGN: ICD-10-CM

## 2022-12-29 LAB
ALT SERPL-CCNC: 17 U/L (ref 5–33)
ANION GAP SERPL CALCULATED.3IONS-SCNC: 9 MMOL/L (ref 9–17)
AST SERPL-CCNC: 24 U/L
BUN BLDV-MCNC: 18 MG/DL (ref 8–23)
BUN/CREAT BLD: 26 (ref 9–20)
CALCIUM SERPL-MCNC: 9.2 MG/DL (ref 8.6–10.4)
CHLORIDE BLD-SCNC: 103 MMOL/L (ref 98–107)
CHOLESTEROL/HDL RATIO: 2.8
CHOLESTEROL: 213 MG/DL
CO2: 27 MMOL/L (ref 20–31)
CREAT SERPL-MCNC: 0.68 MG/DL (ref 0.5–0.9)
GFR SERPL CREATININE-BSD FRML MDRD: >60 ML/MIN/1.73M2
GLUCOSE BLD-MCNC: 92 MG/DL (ref 70–99)
HCT VFR BLD CALC: 40.7 % (ref 36.3–47.1)
HDLC SERPL-MCNC: 75 MG/DL
HEMOGLOBIN: 13.3 G/DL (ref 11.9–15.1)
LDL CHOLESTEROL: 117 MG/DL (ref 0–130)
MCH RBC QN AUTO: 31.3 PG (ref 25.2–33.5)
MCHC RBC AUTO-ENTMCNC: 32.7 G/DL (ref 28.4–34.8)
MCV RBC AUTO: 95.8 FL (ref 82.6–102.9)
NRBC AUTOMATED: 0 PER 100 WBC
PDW BLD-RTO: 12.5 % (ref 11.8–14.4)
PLATELET # BLD: 293 K/UL (ref 138–453)
PMV BLD AUTO: 8.7 FL (ref 8.1–13.5)
POTASSIUM SERPL-SCNC: 4.1 MMOL/L (ref 3.7–5.3)
RBC # BLD: 4.25 M/UL (ref 3.95–5.11)
SODIUM BLD-SCNC: 139 MMOL/L (ref 135–144)
T3 FREE: 2.29 PG/ML (ref 2.02–4.43)
THYROXINE, FREE: 1 NG/DL (ref 0.93–1.7)
TRIGL SERPL-MCNC: 106 MG/DL
TSH SERPL DL<=0.05 MIU/L-ACNC: 2.4 UIU/ML (ref 0.3–5)
WBC # BLD: 4.5 K/UL (ref 3.5–11.3)

## 2022-12-29 PROCEDURE — 85027 COMPLETE CBC AUTOMATED: CPT

## 2022-12-29 PROCEDURE — 84481 FREE ASSAY (FT-3): CPT

## 2022-12-29 PROCEDURE — 84450 TRANSFERASE (AST) (SGOT): CPT

## 2022-12-29 PROCEDURE — 84460 ALANINE AMINO (ALT) (SGPT): CPT

## 2022-12-29 PROCEDURE — 80061 LIPID PANEL: CPT

## 2022-12-29 PROCEDURE — 84443 ASSAY THYROID STIM HORMONE: CPT

## 2022-12-29 PROCEDURE — 36415 COLL VENOUS BLD VENIPUNCTURE: CPT

## 2022-12-29 PROCEDURE — 84439 ASSAY OF FREE THYROXINE: CPT

## 2022-12-29 PROCEDURE — 80048 BASIC METABOLIC PNL TOTAL CA: CPT

## 2023-07-18 ENCOUNTER — HOSPITAL ENCOUNTER (OUTPATIENT)
Age: 78
Discharge: HOME | End: 2023-07-18
Payer: MEDICARE

## 2023-07-18 DIAGNOSIS — E03.9 HYPOTHYROIDISM, UNSPECIFIED TYPE: ICD-10-CM

## 2023-07-18 DIAGNOSIS — E78.2 MIXED HYPERLIPIDEMIA: ICD-10-CM

## 2023-07-18 DIAGNOSIS — R73.01 IMPAIRED FASTING GLUCOSE: ICD-10-CM

## 2023-07-18 DIAGNOSIS — I10 ESSENTIAL HYPERTENSION, BENIGN: ICD-10-CM

## 2023-07-18 LAB
ALT SERPL-CCNC: 17 U/L (ref 5–33)
ANION GAP SERPL CALCULATED.3IONS-SCNC: 8 MMOL/L (ref 9–17)
AST SERPL-CCNC: 26 U/L
BUN SERPL-MCNC: 19 MG/DL (ref 8–23)
BUN/CREAT SERPL: 27 (ref 9–20)
CALCIUM SERPL-MCNC: 9.4 MG/DL (ref 8.6–10.4)
CHLORIDE SERPL-SCNC: 104 MMOL/L (ref 98–107)
CHOLEST SERPL-MCNC: 203 MG/DL
CHOLESTEROL/HDL RATIO: 2.6
CO2 SERPL-SCNC: 28 MMOL/L (ref 20–31)
CREAT SERPL-MCNC: 0.7 MG/DL (ref 0.5–0.9)
ERYTHROCYTE [DISTWIDTH] IN BLOOD BY AUTOMATED COUNT: 12.4 % (ref 11.8–14.4)
EST. AVERAGE GLUCOSE BLD GHB EST-MCNC: 105 MG/DL
GFR SERPL CREATININE-BSD FRML MDRD: >60 ML/MIN/1.73M2
GLUCOSE SERPL-MCNC: 90 MG/DL (ref 70–99)
HBA1C MFR BLD: 5.3 % (ref 4–6)
HCT VFR BLD AUTO: 42.5 % (ref 36.3–47.1)
HDLC SERPL-MCNC: 79 MG/DL
HGB BLD-MCNC: 13.7 G/DL (ref 11.9–15.1)
LDLC SERPL CALC-MCNC: 103 MG/DL (ref 0–130)
MCH RBC QN AUTO: 31.3 PG (ref 25.2–33.5)
MCHC RBC AUTO-ENTMCNC: 32.2 G/DL (ref 28.4–34.8)
MCV RBC AUTO: 97 FL (ref 82.6–102.9)
NRBC BLD-RTO: 0 PER 100 WBC
PLATELET # BLD AUTO: 292 K/UL (ref 138–453)
PMV BLD AUTO: 8.5 FL (ref 8.1–13.5)
POTASSIUM SERPL-SCNC: 5 MMOL/L (ref 3.7–5.3)
RBC # BLD AUTO: 4.38 M/UL (ref 3.95–5.11)
SODIUM SERPL-SCNC: 140 MMOL/L (ref 135–144)
T3FREE SERPL-MCNC: 2.26 PG/ML (ref 2.02–4.43)
T4 FREE SERPL-MCNC: 1.1 NG/DL (ref 0.9–1.7)
TRIGL SERPL-MCNC: 104 MG/DL
TSH SERPL DL<=0.05 MIU/L-ACNC: 4.34 UIU/ML (ref 0.3–5)
WBC OTHER # BLD: 4.1 K/UL (ref 3.5–11.3)

## 2023-07-18 PROCEDURE — 83036 HEMOGLOBIN GLYCOSYLATED A1C: CPT

## 2023-07-18 PROCEDURE — 84481 FREE ASSAY (FT-3): CPT

## 2023-07-18 PROCEDURE — 85027 COMPLETE CBC AUTOMATED: CPT

## 2023-07-18 PROCEDURE — 84450 TRANSFERASE (AST) (SGOT): CPT

## 2023-07-18 PROCEDURE — 84439 ASSAY OF FREE THYROXINE: CPT

## 2023-07-18 PROCEDURE — 84460 ALANINE AMINO (ALT) (SGPT): CPT

## 2023-07-18 PROCEDURE — 84443 ASSAY THYROID STIM HORMONE: CPT

## 2023-07-18 PROCEDURE — 36415 COLL VENOUS BLD VENIPUNCTURE: CPT

## 2023-07-18 PROCEDURE — 80061 LIPID PANEL: CPT

## 2023-07-18 PROCEDURE — 80048 BASIC METABOLIC PNL TOTAL CA: CPT

## 2023-09-28 ENCOUNTER — HOSPITAL ENCOUNTER (OUTPATIENT)
Dept: WOMENS IMAGING | Age: 78
Discharge: HOME | End: 2023-09-30
Payer: MEDICARE

## 2023-09-28 VITALS — WEIGHT: 158 LBS | BODY MASS INDEX: 29.08 KG/M2 | HEIGHT: 62 IN

## 2023-09-28 DIAGNOSIS — Z12.31 BREAST CANCER SCREENING BY MAMMOGRAM: ICD-10-CM

## 2023-09-28 PROCEDURE — 77063 BREAST TOMOSYNTHESIS BI: CPT

## 2024-01-03 ENCOUNTER — HOSPITAL ENCOUNTER (OUTPATIENT)
Age: 79
Discharge: HOME | End: 2024-01-03
Payer: MEDICARE

## 2024-01-03 DIAGNOSIS — R73.01 IMPAIRED FASTING GLUCOSE: ICD-10-CM

## 2024-01-03 DIAGNOSIS — I10 ESSENTIAL HYPERTENSION, BENIGN: ICD-10-CM

## 2024-01-03 DIAGNOSIS — E78.2 MIXED HYPERLIPIDEMIA: ICD-10-CM

## 2024-01-03 DIAGNOSIS — E03.9 HYPOTHYROIDISM, UNSPECIFIED TYPE: ICD-10-CM

## 2024-01-03 LAB
ALT SERPL-CCNC: 15 U/L (ref 5–33)
ANION GAP SERPL CALCULATED.3IONS-SCNC: 10 MMOL/L (ref 9–17)
AST SERPL-CCNC: 25 U/L
BUN SERPL-MCNC: 17 MG/DL (ref 8–23)
BUN/CREAT SERPL: 21 (ref 9–20)
CALCIUM SERPL-MCNC: 9.1 MG/DL (ref 8.6–10.4)
CHLORIDE SERPL-SCNC: 105 MMOL/L (ref 98–107)
CO2 SERPL-SCNC: 27 MMOL/L (ref 20–31)
CREAT SERPL-MCNC: 0.8 MG/DL (ref 0.5–0.9)
ERYTHROCYTE [DISTWIDTH] IN BLOOD BY AUTOMATED COUNT: 12.8 % (ref 11.8–14.4)
GFR SERPL CREATININE-BSD FRML MDRD: >60 ML/MIN/1.73M2
GLUCOSE SERPL-MCNC: 85 MG/DL (ref 70–99)
HCT VFR BLD AUTO: 41 % (ref 36.3–47.1)
HGB BLD-MCNC: 13.2 G/DL (ref 11.9–15.1)
MCH RBC QN AUTO: 31.1 PG (ref 25.2–33.5)
MCHC RBC AUTO-ENTMCNC: 32.2 G/DL (ref 28.4–34.8)
MCV RBC AUTO: 96.7 FL (ref 82.6–102.9)
NRBC BLD-RTO: 0 PER 100 WBC
PLATELET # BLD AUTO: 296 K/UL (ref 138–453)
PMV BLD AUTO: 8.8 FL (ref 8.1–13.5)
POTASSIUM SERPL-SCNC: 4.1 MMOL/L (ref 3.7–5.3)
RBC # BLD AUTO: 4.24 M/UL (ref 3.95–5.11)
SODIUM SERPL-SCNC: 142 MMOL/L (ref 135–144)
T3FREE SERPL-MCNC: 2.41 PG/ML (ref 2.02–4.43)
T4 FREE SERPL-MCNC: 1 NG/DL (ref 0.93–1.7)
TSH SERPL DL<=0.05 MIU/L-ACNC: 1.36 UIU/ML (ref 0.3–5)
WBC OTHER # BLD: 3.8 K/UL (ref 3.5–11.3)

## 2024-01-03 PROCEDURE — 85027 COMPLETE CBC AUTOMATED: CPT

## 2024-01-03 PROCEDURE — 84439 ASSAY OF FREE THYROXINE: CPT

## 2024-01-03 PROCEDURE — 84450 TRANSFERASE (AST) (SGOT): CPT

## 2024-01-03 PROCEDURE — 84443 ASSAY THYROID STIM HORMONE: CPT

## 2024-01-03 PROCEDURE — 80061 LIPID PANEL: CPT

## 2024-01-03 PROCEDURE — 84460 ALANINE AMINO (ALT) (SGPT): CPT

## 2024-01-03 PROCEDURE — 84481 FREE ASSAY (FT-3): CPT

## 2024-01-03 PROCEDURE — 80048 BASIC METABOLIC PNL TOTAL CA: CPT

## 2024-01-03 PROCEDURE — 36415 COLL VENOUS BLD VENIPUNCTURE: CPT

## 2024-01-05 LAB
CHOLEST SERPL-MCNC: 206 MG/DL
CHOLESTEROL/HDL RATIO: 2.6
HDLC SERPL-MCNC: 79 MG/DL
LDLC SERPL CALC-MCNC: 114 MG/DL (ref 0–130)
TRIGL SERPL-MCNC: 63 MG/DL

## 2024-07-17 ENCOUNTER — HOSPITAL ENCOUNTER (OUTPATIENT)
Age: 79
Discharge: HOME | End: 2024-07-17
Payer: MEDICARE

## 2024-07-17 DIAGNOSIS — I10 ESSENTIAL HYPERTENSION, BENIGN: ICD-10-CM

## 2024-07-17 DIAGNOSIS — E03.9 HYPOTHYROIDISM, UNSPECIFIED TYPE: ICD-10-CM

## 2024-07-17 DIAGNOSIS — E78.2 MIXED HYPERLIPIDEMIA: ICD-10-CM

## 2024-07-17 DIAGNOSIS — R73.01 IMPAIRED FASTING GLUCOSE: ICD-10-CM

## 2024-07-17 LAB
ALT SERPL-CCNC: 13 U/L (ref 5–33)
ANION GAP SERPL CALCULATED.3IONS-SCNC: 9 MMOL/L (ref 9–17)
AST SERPL-CCNC: 23 U/L
BUN SERPL-MCNC: 17 MG/DL (ref 8–23)
BUN/CREAT SERPL: 21 (ref 9–20)
CALCIUM SERPL-MCNC: 9.2 MG/DL (ref 8.6–10.4)
CHLORIDE SERPL-SCNC: 100 MMOL/L (ref 98–107)
CHOLEST SERPL-MCNC: 190 MG/DL (ref 0–199)
CHOLESTEROL/HDL RATIO: 3
CO2 SERPL-SCNC: 28 MMOL/L (ref 20–31)
CREAT SERPL-MCNC: 0.8 MG/DL (ref 0.5–0.9)
ERYTHROCYTE [DISTWIDTH] IN BLOOD BY AUTOMATED COUNT: 12.2 % (ref 11.8–14.4)
EST. AVERAGE GLUCOSE BLD GHB EST-MCNC: 108 MG/DL
GFR, ESTIMATED: 76 ML/MIN/1.73M2
GLUCOSE SERPL-MCNC: 91 MG/DL (ref 70–99)
HBA1C MFR BLD: 5.4 % (ref 4–6)
HCT VFR BLD AUTO: 40.3 % (ref 36.3–47.1)
HDLC SERPL-MCNC: 75 MG/DL
HGB BLD-MCNC: 13.4 G/DL (ref 11.9–15.1)
LDLC SERPL CALC-MCNC: 96 MG/DL (ref 0–100)
MCH RBC QN AUTO: 31.8 PG (ref 25.2–33.5)
MCHC RBC AUTO-ENTMCNC: 33.3 G/DL (ref 28.4–34.8)
MCV RBC AUTO: 95.5 FL (ref 82.6–102.9)
NRBC BLD-RTO: 0 PER 100 WBC
PLATELET # BLD AUTO: 296 K/UL (ref 138–453)
PMV BLD AUTO: 8.8 FL (ref 8.1–13.5)
POTASSIUM SERPL-SCNC: 4.2 MMOL/L (ref 3.7–5.3)
RBC # BLD AUTO: 4.22 M/UL (ref 3.95–5.11)
SODIUM SERPL-SCNC: 137 MMOL/L (ref 135–144)
T3FREE SERPL-MCNC: 2.5 PG/ML (ref 2–4.4)
T4 FREE SERPL-MCNC: 1.1 NG/DL (ref 0.92–1.68)
TRIGL SERPL-MCNC: 96 MG/DL
TSH SERPL DL<=0.05 MIU/L-ACNC: 3.35 UIU/ML (ref 0.3–5)
VLDLC SERPL CALC-MCNC: 19 MG/DL
WBC OTHER # BLD: 3.9 K/UL (ref 3.5–11.3)

## 2024-07-17 PROCEDURE — 84439 ASSAY OF FREE THYROXINE: CPT

## 2024-07-17 PROCEDURE — 83036 HEMOGLOBIN GLYCOSYLATED A1C: CPT

## 2024-07-17 PROCEDURE — 84450 TRANSFERASE (AST) (SGOT): CPT

## 2024-07-17 PROCEDURE — 36415 COLL VENOUS BLD VENIPUNCTURE: CPT

## 2024-07-17 PROCEDURE — 80061 LIPID PANEL: CPT

## 2024-07-17 PROCEDURE — 84460 ALANINE AMINO (ALT) (SGPT): CPT

## 2024-07-17 PROCEDURE — 84481 FREE ASSAY (FT-3): CPT

## 2024-07-17 PROCEDURE — 84443 ASSAY THYROID STIM HORMONE: CPT

## 2024-07-17 PROCEDURE — 80048 BASIC METABOLIC PNL TOTAL CA: CPT

## 2024-07-17 PROCEDURE — 85027 COMPLETE CBC AUTOMATED: CPT

## 2024-10-22 ENCOUNTER — HOSPITAL ENCOUNTER (OUTPATIENT)
Dept: WOMENS IMAGING | Age: 79
Discharge: HOME | End: 2024-10-24
Payer: MEDICARE

## 2024-10-22 DIAGNOSIS — Z12.31 VISIT FOR SCREENING MAMMOGRAM: ICD-10-CM

## 2024-10-22 PROCEDURE — 77063 BREAST TOMOSYNTHESIS BI: CPT

## 2025-01-02 ENCOUNTER — HOSPITAL ENCOUNTER (OUTPATIENT)
Age: 80
Discharge: HOME | End: 2025-01-02
Payer: MEDICARE

## 2025-01-02 DIAGNOSIS — E03.9 HYPOTHYROIDISM, UNSPECIFIED TYPE: ICD-10-CM

## 2025-01-02 DIAGNOSIS — E78.2 MIXED HYPERLIPIDEMIA: ICD-10-CM

## 2025-01-02 DIAGNOSIS — R73.01 IMPAIRED FASTING GLUCOSE: ICD-10-CM

## 2025-01-02 DIAGNOSIS — I10 ESSENTIAL HYPERTENSION, BENIGN: ICD-10-CM

## 2025-01-02 LAB
ALT SERPL-CCNC: 16 U/L (ref 10–35)
ANION GAP SERPL CALCULATED.3IONS-SCNC: 11 MMOL/L (ref 9–16)
AST SERPL-CCNC: 30 U/L (ref 10–35)
BUN SERPL-MCNC: 17 MG/DL (ref 8–23)
BUN/CREAT SERPL: 21 (ref 9–20)
CALCIUM SERPL-MCNC: 9.3 MG/DL (ref 8.6–10.4)
CHLORIDE SERPL-SCNC: 103 MMOL/L (ref 98–107)
CHOLEST SERPL-MCNC: 215 MG/DL (ref 0–199)
CHOLESTEROL/HDL RATIO: 2.9
CO2 SERPL-SCNC: 25 MMOL/L (ref 20–31)
CREAT SERPL-MCNC: 0.8 MG/DL (ref 0.5–0.9)
ERYTHROCYTE [DISTWIDTH] IN BLOOD BY AUTOMATED COUNT: 12.4 % (ref 11.8–14.4)
EST. AVERAGE GLUCOSE BLD GHB EST-MCNC: 114 MG/DL
GFR, ESTIMATED: 70 ML/MIN/1.73M2
GLUCOSE SERPL-MCNC: 88 MG/DL (ref 74–99)
HBA1C MFR BLD: 5.6 % (ref 4–6)
HCT VFR BLD AUTO: 39 % (ref 36.3–47.1)
HDLC SERPL-MCNC: 73 MG/DL
HGB BLD-MCNC: 12.8 G/DL (ref 11.9–15.1)
LDLC SERPL CALC-MCNC: 123 MG/DL (ref 0–100)
MCH RBC QN AUTO: 31.4 PG (ref 25.2–33.5)
MCHC RBC AUTO-ENTMCNC: 32.8 G/DL (ref 28.4–34.8)
MCV RBC AUTO: 95.8 FL (ref 82.6–102.9)
NRBC BLD-RTO: 0 PER 100 WBC
PLATELET # BLD AUTO: 297 K/UL (ref 138–453)
PMV BLD AUTO: 8.7 FL (ref 8.1–13.5)
POTASSIUM SERPL-SCNC: 4.1 MMOL/L (ref 3.7–5.3)
RBC # BLD AUTO: 4.07 M/UL (ref 3.95–5.11)
SODIUM SERPL-SCNC: 139 MMOL/L (ref 136–145)
T3FREE SERPL-MCNC: 2.38 PG/ML (ref 2–4.4)
T4 FREE SERPL-MCNC: 1 NG/DL (ref 0.9–1.7)
TRIGL SERPL-MCNC: 96 MG/DL
TSH SERPL DL<=0.05 MIU/L-ACNC: 2.6 UIU/ML (ref 0.27–4.2)
VLDLC SERPL CALC-MCNC: 19 MG/DL (ref 1–30)
WBC OTHER # BLD: 4.7 K/UL (ref 3.5–11.3)

## 2025-01-02 PROCEDURE — 84439 ASSAY OF FREE THYROXINE: CPT

## 2025-01-02 PROCEDURE — 80061 LIPID PANEL: CPT

## 2025-01-02 PROCEDURE — 80048 BASIC METABOLIC PNL TOTAL CA: CPT

## 2025-01-02 PROCEDURE — 83036 HEMOGLOBIN GLYCOSYLATED A1C: CPT

## 2025-01-02 PROCEDURE — 85027 COMPLETE CBC AUTOMATED: CPT

## 2025-01-02 PROCEDURE — 84450 TRANSFERASE (AST) (SGOT): CPT

## 2025-01-02 PROCEDURE — 36415 COLL VENOUS BLD VENIPUNCTURE: CPT

## 2025-01-02 PROCEDURE — 84460 ALANINE AMINO (ALT) (SGPT): CPT

## 2025-01-02 PROCEDURE — 84443 ASSAY THYROID STIM HORMONE: CPT

## 2025-01-02 PROCEDURE — 84481 FREE ASSAY (FT-3): CPT

## 2025-01-13 PROBLEM — J44.9 CHRONIC OBSTRUCTIVE PULMONARY DISEASE, UNSPECIFIED COPD TYPE (HCC): Status: RESOLVED | Noted: 2021-07-01 | Resolved: 2025-01-13

## 2025-07-10 ENCOUNTER — HOSPITAL ENCOUNTER (OUTPATIENT)
Age: 80
Discharge: HOME | End: 2025-07-10
Payer: MEDICARE

## 2025-07-10 DIAGNOSIS — E03.9 HYPOTHYROIDISM, UNSPECIFIED TYPE: ICD-10-CM

## 2025-07-10 DIAGNOSIS — R73.01 IMPAIRED FASTING GLUCOSE: ICD-10-CM

## 2025-07-10 DIAGNOSIS — E78.2 MIXED HYPERLIPIDEMIA: ICD-10-CM

## 2025-07-10 LAB
ALT SERPL-CCNC: 21 U/L (ref 10–35)
ANION GAP SERPL CALCULATED.3IONS-SCNC: 11 MMOL/L (ref 9–16)
AST SERPL-CCNC: 34 U/L (ref 10–35)
BUN SERPL-MCNC: 15 MG/DL (ref 8–23)
BUN/CREAT SERPL: 19 (ref 9–20)
CALCIUM SERPL-MCNC: 9.3 MG/DL (ref 8.6–10.4)
CHLORIDE SERPL-SCNC: 101 MMOL/L (ref 98–107)
CHOLEST SERPL-MCNC: 203 MG/DL (ref 0–199)
CHOLESTEROL/HDL RATIO: 2.4
CO2 SERPL-SCNC: 26 MMOL/L (ref 20–31)
CREAT SERPL-MCNC: 0.8 MG/DL (ref 0.5–0.9)
ERYTHROCYTE [DISTWIDTH] IN BLOOD BY AUTOMATED COUNT: 13.1 % (ref 11.8–14.4)
EST. AVERAGE GLUCOSE BLD GHB EST-MCNC: 105 MG/DL
GFR, ESTIMATED: 77 ML/MIN/1.73M2
GLUCOSE SERPL-MCNC: 90 MG/DL (ref 74–99)
HBA1C MFR BLD: 5.3 % (ref 4–6)
HCT VFR BLD AUTO: 41.5 % (ref 36.3–47.1)
HDLC SERPL-MCNC: 84 MG/DL
HGB BLD-MCNC: 13.9 G/DL (ref 11.9–15.1)
LDLC SERPL CALC-MCNC: 102 MG/DL (ref 0–100)
MCH RBC QN AUTO: 31.7 PG (ref 25.2–33.5)
MCHC RBC AUTO-ENTMCNC: 33.5 G/DL (ref 28.4–34.8)
MCV RBC AUTO: 94.7 FL (ref 82.6–102.9)
NRBC BLD-RTO: 0 PER 100 WBC
PLATELET # BLD AUTO: 278 K/UL (ref 138–453)
PMV BLD AUTO: 8.8 FL (ref 8.1–13.5)
POTASSIUM SERPL-SCNC: 4 MMOL/L (ref 3.7–5.3)
RBC # BLD AUTO: 4.38 M/UL (ref 3.95–5.11)
SODIUM SERPL-SCNC: 138 MMOL/L (ref 136–145)
T3FREE SERPL-MCNC: 2.61 PG/ML (ref 2–4.4)
T4 FREE SERPL-MCNC: 1 NG/DL (ref 0.92–1.68)
TRIGL SERPL-MCNC: 83 MG/DL
TSH SERPL DL<=0.05 MIU/L-ACNC: 3.05 UIU/ML (ref 0.27–4.2)
VLDLC SERPL CALC-MCNC: 17 MG/DL (ref 1–30)
WBC OTHER # BLD: 4.2 K/UL (ref 3.5–11.3)

## 2025-07-10 PROCEDURE — 84443 ASSAY THYROID STIM HORMONE: CPT

## 2025-07-10 PROCEDURE — 84439 ASSAY OF FREE THYROXINE: CPT

## 2025-07-10 PROCEDURE — 84460 ALANINE AMINO (ALT) (SGPT): CPT

## 2025-07-10 PROCEDURE — 80048 BASIC METABOLIC PNL TOTAL CA: CPT

## 2025-07-10 PROCEDURE — 36415 COLL VENOUS BLD VENIPUNCTURE: CPT

## 2025-07-10 PROCEDURE — 84481 FREE ASSAY (FT-3): CPT

## 2025-07-10 PROCEDURE — 83036 HEMOGLOBIN GLYCOSYLATED A1C: CPT

## 2025-07-10 PROCEDURE — 84450 TRANSFERASE (AST) (SGOT): CPT

## 2025-07-10 PROCEDURE — 80061 LIPID PANEL: CPT

## 2025-07-10 PROCEDURE — 85027 COMPLETE CBC AUTOMATED: CPT
